# Patient Record
Sex: MALE | Race: ASIAN | NOT HISPANIC OR LATINO | Employment: FULL TIME | URBAN - METROPOLITAN AREA
[De-identification: names, ages, dates, MRNs, and addresses within clinical notes are randomized per-mention and may not be internally consistent; named-entity substitution may affect disease eponyms.]

---

## 2017-06-11 ENCOUNTER — OFFICE VISIT (OUTPATIENT)
Dept: URGENT CARE | Facility: URGENT CARE | Age: 57
End: 2017-06-11
Payer: COMMERCIAL

## 2017-06-11 VITALS
OXYGEN SATURATION: 97 % | HEART RATE: 104 BPM | WEIGHT: 216 LBS | DIASTOLIC BLOOD PRESSURE: 104 MMHG | BODY MASS INDEX: 34.86 KG/M2 | SYSTOLIC BLOOD PRESSURE: 150 MMHG | TEMPERATURE: 97.9 F

## 2017-06-11 DIAGNOSIS — E79.0 ELEVATED URIC ACID IN BLOOD: ICD-10-CM

## 2017-06-11 DIAGNOSIS — M10.071 ACUTE IDIOPATHIC GOUT OF RIGHT FOOT: Primary | ICD-10-CM

## 2017-06-11 PROCEDURE — 99213 OFFICE O/P EST LOW 20 MIN: CPT | Performed by: FAMILY MEDICINE

## 2017-06-11 RX ORDER — COLCHICINE 0.6 MG/1
1 CAPSULE ORAL CONTINUOUS PRN
Qty: 15 CAPSULE | Refills: 1 | Status: SHIPPED | OUTPATIENT
Start: 2017-06-11 | End: 2017-06-11

## 2017-06-11 RX ORDER — ALLOPURINOL 300 MG/1
300 TABLET ORAL DAILY
Qty: 30 TABLET | Refills: 1 | Status: SHIPPED | OUTPATIENT
Start: 2017-06-11 | End: 2020-07-25

## 2017-06-11 RX ORDER — COLCHICINE 0.6 MG/1
1 CAPSULE ORAL CONTINUOUS PRN
Qty: 15 CAPSULE | Refills: 1 | Status: SHIPPED | OUTPATIENT
Start: 2017-06-11 | End: 2018-04-13

## 2017-06-11 NOTE — MR AVS SNAPSHOT
"              After Visit Summary   6/11/2017    Cydney Flores    MRN: [id number]           Patient Information     Date Of Birth          1960        Visit Information        Provider Department      6/11/2017 1:10 PM Anand Morelos MD [facility]        Today's Diagnoses     Acute gout of left ankle, unspecified cause        Acute gouty arthritis          Care Instructions    cochicine as needed for gout flareups    Start the once daily allopurinol, which is a chronic med to reduce uric acid levels and thus reduce number and severity or flarups ( preventive med )    Advise follow up in clinic in next couple months, sooner if needed             Follow-ups after your visit        Who to contact     If you have questions or need follow up information about today's clinic visit or your schedule please contact [facility] directly at [phone].  Normal or non-critical lab and imaging results will be communicated to you by [employer], letter or phone within 4 business days after the clinic has received the results. If you do not hear from us within 7 days, please contact the clinic through [employer] or phone. If you have a critical or abnormal lab result, we will notify you by phone as soon as possible.  Submit refill requests through [employer] or call your pharmacy and they will forward the refill request to us. Please allow 3 business days for your refill to be completed.          Additional Information About Your Visit        MyChart Information     [employer] lets you send messages to your doctor, view your test results, renew your prescriptions, schedule appointments and more. To sign up, go to [email or web address] . Click on \"Log in\" on the left side of the screen, which will take you to the Welcome page. Then click on \"Sign up Now\" on the right side of the page.     You will be asked to enter the access code listed below, as well as some personal information. Please follow the " directions to create your username and password.     Your access code is: [email or web address]  Expires: 2017  1:29 PM     Your access code will  in 90 days. If you need help or a new code, please call your [facility] clinic or [phone].        Care EveryWhere ID     This is your Care EveryWhere ID. This could be used by other organizations to access your [facility] medical records  [id number]        Your Vitals Were     Pulse Temperature Pulse Oximetry BMI (Body Mass Index)          104 97.9  F (36.6  C) (Oral) 97% 34.86 kg/m2         Blood Pressure from Last 3 Encounters:   17 (!) 150/104   16 (!) 155/99   11/19/15 (!) 145/93    Weight from Last 3 Encounters:   17 216 lb (98 kg)   16 222 lb (100.7 kg)   14 197 lb 12.8 oz (89.7 kg)              Today, you had the following     No orders found for display         Where to get your medicines      These [place] [facility] [place] [address]  [address], [facility] [address]     Phone:  [phone]     allopurinol 300 MG tablet         Some of these will need a paper prescription and others can be bought over the counter.  Ask your nurse if you have questions.     Bring a paper prescription for each of these medications     Colchicine 0.6 MG Caps          Primary Care Provider Office Phone # Fax #    Lencho Montemayor [phone]       [facility] [place] [address]  [place] [address]        Thank you!     Thank you for choosing [facility]  for your care. Our goal is always to provide you with excellent care. Hearing back from our patients is one way we can continue to improve our services. Please take a few minutes to complete the written survey that you may receive in the mail after your visit with us. Thank you!             Your Updated Medication List - Protect others around you: Learn how to safely use, store and  throw away your medicines at www.disposemymeds.org.          This list is accurate as of: 6/11/17  1:29 PM.  Always use your most recent med list.                   Brand Name Dispense Instructions for use    allopurinol 300 MG tablet    ZYLOPRIM    30 tablet    Take 1 tablet (300 mg) by mouth daily       atovaquone-proguanil 250-100 MG per tablet    MALARONE    33 tablet    Take 1 tablet by mouth daily. Start 2 days before travel and continue 7 days after return.       Colchicine 0.6 MG Caps     15 capsule    Take 0.6 mg by mouth continuous prn Take 2 capsules by mouth once, then 1 capsule 1 hour later as needed.  May take 1 capsule every 12 hours as needed after that.       indomethacin 50 MG capsule    INDOCIN    42 capsule    Take 1 capsule (50 mg) by mouth 3 times daily (with meals)       NO ACTIVE MEDICATIONS

## 2017-06-11 NOTE — PROGRESS NOTES
Cydney Flores is a 57 year old male who comes in today for gout.    Started a day ago.    Gets it a couple times per year.    No trauma to foot.    Busy working, lots of rice/ meat/ veggies    Drinks water/ occasional soda    Juices       On exam, patient has classic redness, swelling and tenderness of the right foot 1st mtp joint    Remainder of foot/ ankle okay    No edema in lower legs    Mentation and affect fine    ASSESSMENT / PLAN:  (M10.071) Acute idiopathic gout of right foot  (primary encounter diagnosis)  Comment: patient has used indomethacin and colchicine in past for gout flareups.  He prefers colchicine.  Plan: Colchicine 0.6 MG CAPS        Refill given.    (E79.0) Elevated uric acid in blood  Comment: of note at one point 3 years ago patient was given allopurinol but he didn't stay on this.  Given the frequency of his gout flares, prudent to use chronic preventive med to reduce uric acid levels.  Discussed in detail.   Plan: allopurinol (ZYLOPRIM) 300 MG tablet        Start this.  Warned of initial flare.      Advised patient follow up within 2 months in clinic.  Be seen sooner if needed.          I reviewed the patient's medications, allergies, medical history, family history, and social history.    Anand Morelos MD

## 2017-06-11 NOTE — NURSING NOTE
"Chief Complaint   Patient presents with     Refill Request     Pt does not have primary provider and he wants to refill his Inomethacin 50 mg and Colcrys 0.6 mg       Initial BP (!) 150/104  Pulse 104  Temp 97.9  F (36.6  C) (Oral)  Wt 216 lb (98 kg)  SpO2 97%  BMI 34.86 kg/m2 Estimated body mass index is 34.86 kg/(m^2) as calculated from the following:    Height as of 9/26/16: 5' 6\" (1.676 m).    Weight as of this encounter: 216 lb (98 kg).  Medication Reconciliation: complete   April Marsh MA        "

## 2017-06-11 NOTE — PATIENT INSTRUCTIONS
cochicine as needed for gout flareups    Start the once daily allopurinol, which is a chronic med to reduce uric acid levels and thus reduce number and severity or flarups ( preventive med )    Advise follow up in clinic in next couple months, sooner if needed

## 2017-12-21 PROBLEM — M10.9 GOUT, UNSPECIFIED CAUSE, UNSPECIFIED CHRONICITY, UNSPECIFIED SITE: Status: ACTIVE | Noted: 2017-12-21

## 2017-12-30 DIAGNOSIS — M10.9 GOUT, UNSPECIFIED CAUSE, UNSPECIFIED CHRONICITY, UNSPECIFIED SITE: ICD-10-CM

## 2018-01-02 RX ORDER — COLCHICINE 0.6 MG/1
TABLET ORAL
Qty: 30 TABLET | Refills: 0 | Status: SHIPPED | OUTPATIENT
Start: 2018-01-02 | End: 2018-05-08

## 2018-01-02 NOTE — TELEPHONE ENCOUNTER
Requested Prescriptions   Pending Prescriptions Disp Refills     colchicine 0.6 MG tablet [Pharmacy Med Name: COLCHICINE 0.6MG TABS] 30 tablet 0        Last Written Prescription Date:  12/21/17  Last Fill Quantity: 30,  # refills: 0   Last Office Visit with G, P or [facility] prescribing provider:  6/11/17   Future Office Visit:      Sig: TAKE 2 TABLETS BY MOUTH IMMEDIATELY, THEN 1 TABLET 1 HOUR LATER AS NEEDED. MAY TAKE 1 TABLET EVERY 12 HOURS AS NEEDED AFTER THAT    Gout Agents Protocol Failed    12/30/2017  2:22 PM       Failed - CBC on file in past 12 months    No lab results found.         Failed - ALT on file in past 12 months    Recent Labs   Lab Test  02/25/12   1522   ALT  35            Failed - Normal serum creatinine on file in the past 12 months    Recent Labs   Lab Test  02/25/12   1522   CR  1.16            Passed - Uric acid on file in past 12 months    Recent Labs   Lab Test  06/06/10   1045   URIC  8.9*     If level is 6mg/dL or greater, ok to refill one time and refer to provider.          Passed - Recent or future visit with authorizing provider's specialty    Patient had office visit in the last year or has a visit in the next 30 days with authorizing provider.  See chart review.              Passed - Patient is age 18 or older        Routing refill request to provider for review/approval because:  Labs not current:  CBC, AST, ALT    Emma Vaz RN  [facility]

## 2018-04-13 ENCOUNTER — OFFICE VISIT (OUTPATIENT)
Dept: FAMILY MEDICINE | Facility: CLINIC | Age: 58
End: 2018-04-13
Payer: COMMERCIAL

## 2018-04-13 VITALS
TEMPERATURE: 98.4 F | BODY MASS INDEX: 36 KG/M2 | SYSTOLIC BLOOD PRESSURE: 153 MMHG | HEIGHT: 66 IN | DIASTOLIC BLOOD PRESSURE: 100 MMHG | HEART RATE: 94 BPM | WEIGHT: 224 LBS | OXYGEN SATURATION: 95 %

## 2018-04-13 DIAGNOSIS — Z11.59 NEED FOR HEPATITIS C SCREENING TEST: ICD-10-CM

## 2018-04-13 DIAGNOSIS — I10 ESSENTIAL HYPERTENSION WITH GOAL BLOOD PRESSURE LESS THAN 140/90: ICD-10-CM

## 2018-04-13 DIAGNOSIS — Z00.00 ENCOUNTER FOR ROUTINE ADULT HEALTH EXAMINATION WITHOUT ABNORMAL FINDINGS: Primary | ICD-10-CM

## 2018-04-13 DIAGNOSIS — Z13.6 CARDIOVASCULAR SCREENING; LDL GOAL LESS THAN 160: ICD-10-CM

## 2018-04-13 DIAGNOSIS — M1A.09X0 IDIOPATHIC CHRONIC GOUT OF MULTIPLE SITES WITHOUT TOPHUS: ICD-10-CM

## 2018-04-13 DIAGNOSIS — Z12.5 SCREENING FOR PROSTATE CANCER: ICD-10-CM

## 2018-04-13 DIAGNOSIS — Z12.11 SCREEN FOR COLON CANCER: ICD-10-CM

## 2018-04-13 DIAGNOSIS — R10.33 PERIUMBILICAL PAIN: ICD-10-CM

## 2018-04-13 PROBLEM — E66.01 SEVERE OBESITY (BMI 35.0-39.9) WITH COMORBIDITY (H): Status: ACTIVE | Noted: 2018-04-13

## 2018-04-13 LAB
ALT SERPL W P-5'-P-CCNC: 35 U/L (ref 0–70)
ANION GAP SERPL CALCULATED.3IONS-SCNC: 8 MMOL/L (ref 3–14)
BUN SERPL-MCNC: 24 MG/DL (ref 7–30)
CALCIUM SERPL-MCNC: 8.7 MG/DL (ref 8.5–10.1)
CHLORIDE SERPL-SCNC: 109 MMOL/L (ref 94–109)
CHOLEST SERPL-MCNC: 198 MG/DL
CO2 SERPL-SCNC: 22 MMOL/L (ref 20–32)
CREAT SERPL-MCNC: 1.4 MG/DL (ref 0.66–1.25)
ERYTHROCYTE [DISTWIDTH] IN BLOOD BY AUTOMATED COUNT: 13 % (ref 10–15)
GFR SERPL CREATININE-BSD FRML MDRD: 52 ML/MIN/1.7M2
GLUCOSE SERPL-MCNC: 93 MG/DL (ref 70–99)
HCT VFR BLD AUTO: 43.5 % (ref 40–53)
HDLC SERPL-MCNC: 34 MG/DL
HGB BLD-MCNC: 14.7 G/DL (ref 13.3–17.7)
LDLC SERPL CALC-MCNC: ABNORMAL MG/DL
LDLC SERPL DIRECT ASSAY-MCNC: 99 MG/DL
MCH RBC QN AUTO: 31.1 PG (ref 26.5–33)
MCHC RBC AUTO-ENTMCNC: 33.8 G/DL (ref 31.5–36.5)
MCV RBC AUTO: 92 FL (ref 78–100)
NONHDLC SERPL-MCNC: 164 MG/DL
PLATELET # BLD AUTO: 502 10E9/L (ref 150–450)
POTASSIUM SERPL-SCNC: 3.2 MMOL/L (ref 3.4–5.3)
PSA SERPL-ACNC: 0.46 UG/L (ref 0–4)
RBC # BLD AUTO: 4.73 10E12/L (ref 4.4–5.9)
SODIUM SERPL-SCNC: 139 MMOL/L (ref 133–144)
TRIGL SERPL-MCNC: 469 MG/DL
URATE SERPL-MCNC: 8.1 MG/DL (ref 3.5–7.2)
WBC # BLD AUTO: 8 10E9/L (ref 4–11)

## 2018-04-13 PROCEDURE — 84460 ALANINE AMINO (ALT) (SGPT): CPT | Performed by: FAMILY MEDICINE

## 2018-04-13 PROCEDURE — 86803 HEPATITIS C AB TEST: CPT | Performed by: FAMILY MEDICINE

## 2018-04-13 PROCEDURE — 36415 COLL VENOUS BLD VENIPUNCTURE: CPT | Performed by: FAMILY MEDICINE

## 2018-04-13 PROCEDURE — 84550 ASSAY OF BLOOD/URIC ACID: CPT | Performed by: FAMILY MEDICINE

## 2018-04-13 PROCEDURE — 85027 COMPLETE CBC AUTOMATED: CPT | Performed by: FAMILY MEDICINE

## 2018-04-13 PROCEDURE — G0103 PSA SCREENING: HCPCS | Performed by: FAMILY MEDICINE

## 2018-04-13 PROCEDURE — 99396 PREV VISIT EST AGE 40-64: CPT | Performed by: FAMILY MEDICINE

## 2018-04-13 PROCEDURE — 83721 ASSAY OF BLOOD LIPOPROTEIN: CPT | Mod: 59 | Performed by: FAMILY MEDICINE

## 2018-04-13 PROCEDURE — 80061 LIPID PANEL: CPT | Performed by: FAMILY MEDICINE

## 2018-04-13 PROCEDURE — 80048 BASIC METABOLIC PNL TOTAL CA: CPT | Performed by: FAMILY MEDICINE

## 2018-04-13 PROCEDURE — 99213 OFFICE O/P EST LOW 20 MIN: CPT | Mod: 25 | Performed by: FAMILY MEDICINE

## 2018-04-13 ASSESSMENT — PAIN SCALES - GENERAL: PAINLEVEL: NO PAIN (0)

## 2018-04-13 NOTE — PATIENT INSTRUCTIONS
At [facility], we strive to deliver an exceptional experience to you, every time we see you.  If you receive a survey in the mail, please send us back your thoughts. We really do value your feedback.    Based on your medical history, these are the current health maintenance/preventive care services that you are due for (some may have been done at this visit.)  Health Maintenance Due   Topic Date Due     HEPATITIS C SCREENING  06/01/1979     COLON CANCER SCREEN (SYSTEM ASSIGNED)  06/01/2011     ADVANCE DIRECTIVE PLANNING Q5 YRS  06/01/2016     LIPID SCREEN Q5 YR MALE (SYSTEM ASSIGNED)  02/25/2017         Suggested websites for health information:  Www.[email or web address] : Up to date and easily searchable information on multiple topics.  Www.medlineplus.gov : medication info, interactive tutorials, watch real surgeries online  Www.familydoctor.org : good info from the Academy of Family Physicians  Www.cdc.gov : public health info, travel advisories, epidemics (H1N1)  Www.aap.org : children's health info, normal development, vaccinations  Www.[email or web address] : [place] dept of health, public health issues in [place], N1N1    Your care team:                            Family Medicine Internal Medicine   MD Xander Goldstein MD Shantel Branch-Fleming, MD Katya Georgiev PA-C Megan Hill, APRSKYLAR Ruiz MD Pediatrics   FAYE Villavicencio, KAYLA Navas APRN CNP   MD Judith Samano MD Deborah Mielke, MD Kim Thein, APRN [facility]      Clinic hours: Monday - Thursday 7 am-7 pm; Fridays 7 am-5 pm.   Urgent care: Monday - Friday 11 am-9 pm; Saturday and Sunday 9 am-5 pm.  Pharmacy : Monday -Thursday 8 am-8 pm; Friday 8 am-6 pm; Saturday and Sunday 9 am-5 pm.     Clinic: [phone]   Pharmacy: [phone]        Preventive Health Recommendations  Male Ages 50   64    Yearly exam:             See your health care provider every year in order to  o   Review  health changes.   o   Discuss preventive care.    o   Review your medicines if your doctor has prescribed any.     Have a cholesterol test every 5 years, or more frequently if you are at risk for high cholesterol/heart disease.     Have a diabetes test (fasting glucose) every three years. If you are at risk for diabetes, you should have this test more often.     Have a colonoscopy at age 50, or have a yearly FIT test (stool test). These exams will check for colon cancer.      Talk with your health care provider about whether or not a prostate cancer screening test (PSA) is right for you.    You should be tested each year for STDs (sexually transmitted diseases), if you re at risk.     Shots: Get a flu shot each year. Get a tetanus shot every 10 years.     Nutrition:    Eat at least 5 servings of fruits and vegetables daily.     Eat whole-grain bread, whole-wheat pasta and brown rice instead of white grains and rice.     Talk to your provider about Calcium and Vitamin D.     Lifestyle    Aerobic exercise for at least 150 minutes a week (40+ minutes per day, 4-6 days per week). This will help you control your weight and prevent disease.      Limit alcohol to one drink per day.     No smoking.     Wear sunscreen to prevent skin cancer.     See your dentist every six months for an exam and cleaning.     See your eye doctor every 1 to 2 years.    Uncontrolled High Blood Pressure (Established)    Your blood pressure was unusually high today. This can occur if you ve missed doses of your blood pressure medicine. Or it can happen if you are taking other medicines. These include some asthma inhalers, decongestants, diet pills, and street drugs like cocaine and amphetamine.  Other causes include:    Weight gain    More salt in your diet    Smoking    Caffeine  Your blood pressure can also rise if you are emotionally upset or in intense pain. It may go back to normal after a period of rest.  A blood pressure reading is made up  of 2 numbers. There is a top number over a bottom number. The top number is the systolic pressure. The bottom number is the diastolic pressure. A normal blood pressure is a systolic pressure of less than 120 over a diastolic pressure of less than 80. High blood pressure (hypertension) is when the top number is 140 or higher. Or it is when the bottom number is 90 or higher. You will see your blood pressure readings written together. For example, a person with a systolic pressure of 118 and a diastolic pressure of 78 will have 118/78 written in the medical record. To be high blood pressure, the numbers must be higher when tested over a period of time. The blood pressures between normal and hypertension are called prehypertension. Prehypertension is a warning sign. The information gives you a chance to make lifestyle changes (weight loss, more exercise) that can keep your blood pressure from going higher.  Home care  It s important to take steps to lower your blood pressure. If you are taking blood pressure medicine, the guidelines below may help you need less or no medicines in the future.    Begin a weight-loss program if you are overweight.    Cut back on the amount of salt in your diet:    Avoid high-salt foods like olives, pickles, smoked meats, and salted potato chips.    Don t add salt to your food at the table.    Use only small amounts of salt when cooking.    Begin an exercise program. Talk with your health care provider about what exercise program is best for you. It doesn t have to be difficult. Even brisk walking for 20 minutes 3 times a week is a good form of exercise.    Avoid medicines that stimulates the heart. This includes many over-the-counter cold and sinus decongestant pills and sprays, as well as diet pills. Check the warnings about hypertension on the label. Before purchasing any over-the-counter medicines or supplements, always ask the pharmacist about the product's potential interaction with  your high blood pressure and your medicines.    Stimulants such as amphetamine or cocaine could be lethal for someone with hypertension. Never take these.    Limit how much caffeine you drink. Or switch to noncaffeinated beverages.    Stop smoking. If you are a long-time smoker, this can be hard. Enroll in a stop-smoking program to make it more likely that you will succeed. Talk with your provider about ways to quit.    Learn how to handle stress better. This is an important part of any program to lower blood pressure. Learn ways to relax. These include meditation, yoga, and biofeedback.    If medicines were prescribed, take them exactly as directed. Missing doses may cause your blood pressure to get out of control.    If you miss a dose or doses of your medicines, check with your healthcare provider or pharmacist about what to do.    Consider buying an automatic blood pressure machine. Your provider may recommend a certain type. You can get one of these at most pharmacies. Measure your blood pressure twice a day, in the morning, and in the late afternoon. Keep a written record of your home blood pressure readings and take the record to your medical appointments.  Here are some additional guidelines on home blood pressure monitoring from the American Heart Association.    Don't smoke or drink coffee for 30 minutes    Go to the bathroom before the test.    Relax for 5 minutes before taking the measurement.    Sit correctly. Be sure your back is supported. Don't sit on a couch or soft chair. Uncross your feet and place them flat on the floor. Place your arm on a solid, flat surface like a table with the upper arm at heart level. Make certain the middle of the cuff is directly above the eye of the elbow. Check the monitor's instruction manual for an illustration.    Take multiple readings. When you measure, take 2 or 3 readings one minute apart and record all of the results.    Take your blood pressure at the same time  every day, or as your healthcare provider recommends.    Record the date, time, and blood pressure reading.    Take the record with you to your next appointment. If your blood pressure monitor has a built-in memory, simply take the monitor with you to your next appointment.    Call your provider if you have several high readings. Don't be frightened by a single high reading, but if you get several high readings, check in with your healthcare provider.    Note: When blood pressure reaches a systolic (top number) of 180 or higher or a diastolic (bottom number) of 110 or higher, emergency medical treatment is required. Call your healthcare provider immediately.  Follow-up care  Regular visits to your own healthcare provider for blood pressure and medicine checks are an important part of your care. Make a follow-up appointment as directed. Bring the record of your home blood pressure readings to the appointment.  When to seek medical advice  Call your healthcare provider right away if any of these occur:    Blood pressure reaches a systolic (top number) of 180 or higher or diastolic (bottom number) of 110 or higher, emergency medical treatment is required.    Chest, arm, shoulder, neck, or upper back pain    Shortness of breath    Severe headache    Throbbing or rushing sound in the ears    Nosebleed    Extreme drowsiness, confusion, or fainting    Dizziness or dizziness with spinning sensation (vertigo)    Weakness in an arm or leg or on one side of the face    Trouble speaking or seeing   Date Last Reviewed: 1/1/2017 2000-2017 [employer]. [address] [place] [address], [place] [id number]. All rights reserved. This information is not intended as a substitute for professional medical care. Always follow your healthcare professional's instructions.         Colorectal Cancer Screening    Colorectal cancer (cancer in the colon or rectum) is a leading cause of cancer deaths in the [place] But it doesn t have to be.  When this cancer is found and removed early, the chances of a full recovery are very good. Because colorectal cancer rarely causes symptoms in its early stages, screening for the disease is important. It s even more crucial if you have risk factors for the disease. Learn more about colorectal cancer and its risk factors. Then talk to your healthcare provider about being screened. You could be saving your own life.  Risk factors for colorectal cancer  Your risk of having colorectal cancer increases if you:    Are 50 years of age or older    Have a family history or personal history of colorectal cancer or polyps    Have a personal history of type 2 diabetes, Crohn s disease, or ulcerative colitis    Have an inherited genetic syndrome like Koch syndrome (also known as HNPCC) or familial adenomatous polyposis (FAP)    Are very overweight    Are not physically active    Smoke    Drink a lot of alcohol    Eat a lot of red or processed meat  The colon and rectum  Waste from food you eat enters the colon from the small intestine. As it travels through the colon, the waste (stool) loses water and becomes more solid. Intestinal muscles push it toward the sigmoid the last section of the colon. Stool then moves into the rectum, where it s stored until it s ready to leave the body during a bowel movement.  How cancer develops  Polyps are growths that form on the inner lining of the colon or rectum. Most are benign, which means they aren t cancerous. But over time, some polyps can become cancer (malignant). This happens when cells in these polyps begin growing abnormally. In time, malignant cells invade more and more of the colon and rectum. The cancer may also spread to nearby organs or lymph nodes or to other parts of the body. Finding and removing polyps can help prevent cancer from ever forming.  Your screening  Screening means looking for a health problem before you have symptoms. During screening for colorectal cancer,  your healthcare provider will ask about your health history, examine you, and do one or more tests.  History and exam  The history and exam involve the following:    Health history. Your healthcare provider will ask about your health history. Mention if a family member has had colon cancer or polyps. Also mention any health problems you have had in the past.    Digital rectal exam (MIGUEL). During a MIGUEL, the healthcare provider inserts a lubricated gloved finger into the rectum. The test is painless and takes less than a minute. Healthcare providers agree that this test alone is not enough to screen for colorectal cancer.  Screening test choices  Fecal occult blood test (FOBT) or fecal immunochemical test (FIT)  These tests check for occult blood in stool (blood you can t see). Hidden blood may be a sign of colon polyps or cancer. A small sample of stool is tested for blood in a laboratory. Most often, you collect this sample at home using a kit your healthcare provider gives you. Follow the instructions carefully for using this kit. You might need to avoid certain foods and medicines before the test, as directed.  Barium enema with contrast (double-contrast barium enema)  This test uses X-rays to provide images of the entire colon and rectum. The day before this test, you will need to do a bowel prep to clean out the colon and rectum. A bowel prep is a liquid diet plus strong laxatives or enemas. You will be awake for the test, but you may be given medicine to help you relax. At the start of the test, a radiologist (a healthcare provider who specializes in imaging tests) places a soft tube into the rectum. The tube is used to fill the colon with a contrast liquid (barium) and air. This can be uncomfortable for some people. The liquid helps the colon show up clearly on the X-rays. Because the test uses X-rays, it exposes you to a small amount of radiation.  Virtual colonoscopy  This exam is also called a CT  colonography. It uses a series of X-ray photographs to create a 3-D view of the colon and rectum. The day before the test, you will need to do a bowel prep to clean out your colon. Your healthcare provider will give you instructions on how to do this. During the procedure, you will lie on a table that is part of a special X-ray machine called a CT scanner. A small tube will be placed into your rectum to fill the colon and rectum with air. This can be uncomfortable for some people. Then, the table will move into the machine and pictures will be taken of your colon and rectum. A computer will combine these photos to create a 3-D picture. Because the test uses X-rays, it exposes you to a small amount of radiation.  Scope exams  Here are two types of scope exams:    Colonoscopy. This test can be used to find and remove polyps anywhere in the colon or rectum. The day before the test, you will do a bowel prep. This is a liquid diet plus a strong laxative solution or an enema. The bowel prep will cleanse your colon. You will be given instructions for this. Just before the test, you are given a medicine to make you sleepy. Then, a long, flexible, lighted tube called a colonoscope is gently inserted into the rectum and guided through the entire colon. Images of the colon are viewed on a video screen. Any polyps that are found are removed and sent to a lab for testing. If a polyp can t be removed, a sample of tissue is taken and the polyp might be removed later during surgery. You will need to bring someone with you to drive you home after this test.     Sigmoidoscopy. This test is similar to colonoscopy, but focuses only on the sigmoid colon and rectum. As with colonoscopy, bowel prep must be done the day before this test. It might not need to be as complete as the bowel prep for a colonoscopy. You are awake during the procedure, but you may be given medicine to help you relax. During the test, the healthcare provider guides  a thin, flexible, lighted tube called a sigmoidoscope through your rectum and lower colon. The images are displayed on a video screen. Polyps are removed, if possible, and sent to a lab for testing.  Colonoscopy is the only screening test that lets your healthcare provider see the entire colon and rectum. This test also lets your healthcare provider remove any pieces of tissue that need to be looked at by a lab. If something suspicious is found using any other tests, you will likely need a colonoscopy.     When to call your healthcare provider after a test  Call your healthcare provider if you have any of the following after any screening test:    Bleeding    Fever of 100.4 F (38 C) or higher, or as directed by your healthcare provider    Abdominal pain    Vomiting   Date Last Reviewed: 11/4/2015 2000-2017 [employer]. [address] [place] [address], [place] [address]. All rights reserved. This information is not intended as a substitute for professional medical care. Always follow your healthcare professional's instructions.      [employer] Hypertension Study Summary     Thank you for your interest in the [employer] hypertension research study. This study is designed to test whether genetically guided blood pressure medication management is better than the standard of care.  Both groups will use medications that have been used for many years to treat high blood pressure ( diuretics, beta blockers, calcium channel blockers, AceI /ARB).  The ORDER of medications chosen may be different however. All participants will receive the genetic testing for free along with free research related visits.  Participants will not be given the results of their genetic test results until the END of the study.Participants will also receive compensation totaling about $100 for completing all study visits and surveys.      If you would like to enroll or know more about using your genetics to determine which hypertensive medications may  work best for you please contact the research coordinator as [phone] or email [email or web address]    Who may qualify for the study:  1) New diagnosis of high blood pressure but not yet on blood pressure medication.  2) Existing diagnosis of hypertension but blood pressure is  uncontrolled with 1 or less class of medications.   3) Age between 30 and 70  4) BMI between 19-50    Who should NOT be in the study:    1) All patient taking more than 1 class of hypertensive medication are excluded.   2) Documented instance of following conditions    A. Cardiac Disease  i. ICD-10 Code for Coronary Artery Disease   CAD: 410.* -414.*, I25.*  ii. ICD-10 Code for Heart Failure   428.*, I50.*  iii. ICD-10 Code for Congenital Cardiac Disease   745.*, -747.*, Q20.*, -Q28.*   B. Kidney Disease        i. ICD-10 Code for Chronic Kidney Disease   CKD:ICD9 585.*and ICD10 N18.*   C. Vascular Disease        i. ICD-10 Code for Peripheral Vascular Disease   443.9, I73.9        ii. ICD-10 Code for Pulmonary Hypertension   416.0, 416.8, I27.0, I27.2   D. Secondary Hypertension                     i. ICD-10 Code for Hypertension Secondary to Other Diseases   I15.0-2, I15.8-9  3) Any patient who has chronic medical conditions that  their doctor/provider feels would make them unsafe to participate in a research study where initial choice of blood pressure  medication could be from 1 of these 4 BP classes of medicines: diuretics, beta blockers, calcium channel blockers, AceI /ARB.    Study visit occur at the following clinic locations  North:  1) [place]  2) [place]  3) [place]  4) [place]  5) [place]  6) [place]  7) [place]    [place]:  1) [facility])  2) [place]  [facility]) [facility]  [facility]) [place]    Patient Expectations:    1) Take Hypertension medications  2) Attend scheduled study visits  3) Complete online surveys sent between visits     If enrolled patient is asked to attend 5 to 8 study visits over the  next 12 months.

## 2018-04-13 NOTE — PROGRESS NOTES
SUBJECTIVE:   CC: Cydney Flores is an 57 year old male who presents for preventative health visit.     Healthy Habits:    Do you get at least three servings of calcium containing foods daily (dairy, green leafy vegetables, etc.)? yes    Amount of exercise or daily activities, outside of work: none    Problems taking medications regularly No    Medication side effects: No    Have you had an eye exam in the past two years? no    Do you see a dentist twice per year? no    Do you have sleep apnea, excessive snoring or daytime drowsiness?yes      Today's PHQ-2 Score:   PHQ-2 ( 1999 [employer]) 8/20/2012 1/9/2012   Q1: Little interest or pleasure in doing things 0 0   Q2: Feeling down, depressed or hopeless 0 0   PHQ-2 Score 0 0       Abuse: Current or Past(Physical, Sexual or Emotional)- NO  Do you feel safe in your environment - YES    Social History   Substance Use Topics     Smoking status: Never Smoker     Smokeless tobacco: Never Used     Alcohol use Yes      Comment: occasional       If you drink alcohol do you typically have >3 drinks per day or >7 drinks per week? No                      Last PSA:   PSA   Date Value Ref Range Status   02/25/2012 0.65 0 - 4 ug/L Final       Blood pressure:  He does not currently take medications for high blood pressure, and he believes he can lower this on his own with exercise in the summer.    Past medical, family, and social histories, medications, and allergies are reviewed and updated in Epic.     ROS:  C: NEGATIVE for fever, chills, change in weight  I: NEGATIVE for worrisome rashes, moles or lesions  E: NEGATIVE for vision changes or irritation  ENT: NEGATIVE for ear, mouth and throat problems  R: NEGATIVE for significant cough or SOB  CV: NEGATIVE for chest pain, palpitations or peripheral edema  GI: For a couple of years, he has had issues with digesting his food and has pain occasionally in the epigastric area but mainly in the periumbilical area. He was evaluated by a provider  "for that and was recommended to take Prilosec.   male: negative for dysuria, hematuria, decreased urinary stream, erectile dysfunction, urethral discharge  M: NEGATIVE for significant arthralgias or myalgia  N: NEGATIVE for weakness, dizziness or paresthesias  P: NEGATIVE for changes in mood or affect    This document serves as a record of the services and decisions personally performed and made by Dr. Winn. It was created on his behalf by Danya Barrett, a trained medical scribe. The creation of this document is based the provider's statements to the medical scribe.  Danya Barrett April 13, 2018 4:45 PM   OBJECTIVE:   BP (!) 153/100  Pulse 94  Temp 98.4  F (36.9  C) (Oral)  Ht 1.676 m (5' 6\")  Wt 101.6 kg (224 lb)  SpO2 95%  BMI 36.15 kg/m2  EXAM:  GENERAL: healthy, alert and no distress  EYES: Eyes grossly normal to inspection, PERRL and conjunctivae and sclerae normal  HENT: ear canals and TM's normal, nose and mouth without ulcers or lesions  NECK: no adenopathy, no asymmetry, masses, or scars and thyroid normal to palpation  RESP: lungs clear to auscultation, no crackles or wheezes, no areas of dullness, no tachypnea   CV: regular rate and rhythm, normal S1 S2, no S3 or S4, no murmur, click or rub, no peripheral edema and peripheral pulses strong  ABDOMEN: soft, nontender, no hepatosplenomegaly, no masses and bowel sounds normal   (male): normal male genitalia without lesions or urethral discharge, no hernia  MS: no gross musculoskeletal defects noted, no edema  SKIN: no suspicious lesions or rashes  NEURO: Normal strength and tone, mentation intact and speech normal, DTRs symmetrical, cranial nerves 2-12 intact   PSYCH: mentation appears normal, affect normal/bright     ASSESSMENT/PLAN:   (Z00.00) Encounter for routine adult health examination without abnormal findings  (primary encounter diagnosis)  Comment: Negative screening exam; up-to-date on preventive services except colon cancer " screening.   Plan: Hepatitis C Screen Reflex to HCV RNA Quant and         Genotype, Lipid panel reflex to direct LDL         Non-fasting, Prostate spec antigen screen        Follow up in 1 year.  Information sheet regarding split billing provided to patient upon arrival and attached to his AVS.    (I10) Essential hypertension with goal blood pressure less than 140/90  Comment: Uncontrolled while unmedicated. He is aware of the diagnosis but is not interested in starting a medication at this time and prefers to recheck his blood pressure later in the summer once he resumes his exercise routine and hopefully loses weight.  Plan: Basic metabolic panel, CBC with platelets        Handouts, blood pressure graph, and weight graph provided. Information on PGen study provided. He is aware that he may be contacted regarding PGen but would not necessarily enroll in the study unless he is still hypertensive this summer. Follow up in 3 months to recheck blood pressure. If still elevated at that visit, I recommend starting medication.    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  Plan: Uric acid, CBC with platelets, ALT        Adjust allopurinol dose as needed. Refills were not requested today.     (R10.33) Periumbilical pain  Comment: Patient reports partial response to the Prilosec, but an exact cause of his discomfort is not completely understood, so I think he should have direct visualization.   Plan: GASTROENTEROLOGY ADULT REF PROCEDURE ONLY [facility] [phone]; [facility]         Surgery            (Z12.11) Screen for colon cancer  Comment: Patient reports having had 2 colonoscopies in the past, starting under age 50. I don't have the details of this, but it suggests he have a history of adenomas, so an earlier colonoscopy would be more appropriate than FIT and this can help evaluate his abdominal complaints.   Plan: GASTROENTEROLOGY ADULT REF PROCEDURE  "ONLY [facility]        [facility] [phone]; [facility]         Surgery        Handouts provided. I encouraged him to schedule a colonoscopy at the same time as his EGD.    (Z12.5) Screening for prostate cancer  Comment:   Plan: Prostate spec antigen screen            (Z11.59) Need for hepatitis C screening test  Comment: indications for screening discussed with the patient   Plan: Hepatitis C Screen Reflex to HCV RNA Quant and         Genotype            (Z13.6) CARDIOVASCULAR SCREENING; LDL GOAL LESS THAN 160  Comment: LDL = 160 at last check in 2012.  Plan: Lipid panel reflex to direct LDL Non-fasting        Monitor periodically.       COUNSELING:  Reviewed preventive health counseling, as reflected in patient instructions  Special attention given to:        Regular exercise       Consider Hep C screening for patients born between 1945 and 1965       Colon cancer screening       Prostate cancer screening    BP Screening:   Last 3 BP Readings:    BP Readings from Last 3 Encounters:   04/13/18 (!) 153/100   06/11/17 (!) 150/104   09/26/16 (!) 155/99     The following was recommended to the patient:  Recommend lifestyle modifications and Laboratory tests and return in summer to recheck blood pressure     reports that he has never smoked. He has never used smokeless tobacco.    Estimated body mass index is 36.15 kg/(m^2) as calculated from the following:    Height as of this encounter: 1.676 m (5' 6\").    Weight as of this encounter: 101.6 kg (224 lb).   Weight management plan: encouraged increased aerobic exercise, as outlined in the AVS. In the summer, he uses workout machines 3 times per week. In the winter, however, he does not do any exercise.    Counseling Resources:  ATP IV Guidelines  Pooled Cohorts Equation Calculator  FRAX Risk Assessment  ICSI Preventive Guidelines  Dietary Guidelines for Americans, 2010  USDA's MyPlate  ASA Prophylaxis  Lung CA Screening    The information in this document, created " by the medical scribe for me, accurately reflects the services I personally performed and the decisions made by me. I have reviewed and approved this document for accuracy prior to leaving the patient care area. April 13, 2018 4:45 PM        Morgan Winn MD  [facility]

## 2018-04-13 NOTE — MR AVS SNAPSHOT
After Visit Summary   4/13/2018    Cydney Flores    MRN: [id number]           Patient Information     Date Of Birth          1960        Visit Information        Provider Department      4/13/2018 4:00 PM Morgan Winn MD [facility]        Today's Diagnoses     Encounter for routine adult health examination without abnormal findings    -  1    Essential hypertension with goal blood pressure less than 140/90        Idiopathic chronic gout of multiple sites without tophus        Periumbilical pain        Screen for colon cancer        Screening for prostate cancer        Need for hepatitis C screening test        CARDIOVASCULAR SCREENING; LDL GOAL LESS THAN 160          Care Instructions    At [facility], we strive to deliver an exceptional experience to you, every time we see you.  If you receive a survey in the mail, please send us back your thoughts. We really do value your feedback.    Based on your medical history, these are the current health maintenance/preventive care services that you are due for (some may have been done at this visit.)  Health Maintenance Due   Topic Date Due     HEPATITIS C SCREENING  06/01/1979     COLON CANCER SCREEN (SYSTEM ASSIGNED)  06/01/2011     ADVANCE DIRECTIVE PLANNING Q5 YRS  06/01/2016     LIPID SCREEN Q5 YR MALE (SYSTEM ASSIGNED)  02/25/2017         Suggested websites for health information:  Www.[employer]itroS[employer].org : Up to date and easily searchable information on multiple topics.  Www.medlineplus.gov : medication info, interactive tutorials, watch real surgeries online  Www.familydoctor.org : good info from the Academy of Family Physicians  Www.cdc.gov : public health info, travel advisories, epidemics (H1N1)  Www.aap.org : children's health info, normal development, vaccinations  Www.health.state.[place].us : [place] dept of health, public health issues in [place], N1N1    Your care team:                            Family Medicine  Internal Medicine   MD Xander Goldstein MD Shantel Branch-Fleming, MD Katya Georgiev PA-C Megan Hill, APRN KAYLA Ruiz MD Pediatrics   FAYE Villavicencio, KAYLA Navas APRN CNP   MD Judith Samano MD Deborah Mielke, MD Kim Thein, APRN [facility]      Clinic hours: Monday - Thursday 7 am-7 pm; Fridays 7 am-5 pm.   Urgent care: Monday - Friday 11 am-9 pm; Saturday and Sunday 9 am-5 pm.  Pharmacy : Monday -Thursday 8 am-8 pm; Friday 8 am-6 pm; Saturday and Sunday 9 am-5 pm.     Clinic: [phone]   Pharmacy: [phone]        Preventive Health Recommendations  Male Ages 50 - 64    Yearly exam:             See your health care provider every year in order to  o   Review health changes.   o   Discuss preventive care.    o   Review your medicines if your doctor has prescribed any.     Have a cholesterol test every 5 years, or more frequently if you are at risk for high cholesterol/heart disease.     Have a diabetes test (fasting glucose) every three years. If you are at risk for diabetes, you should have this test more often.     Have a colonoscopy at age 50, or have a yearly FIT test (stool test). These exams will check for colon cancer.      Talk with your health care provider about whether or not a prostate cancer screening test (PSA) is right for you.    You should be tested each year for STDs (sexually transmitted diseases), if you re at risk.     Shots: Get a flu shot each year. Get a tetanus shot every 10 years.     Nutrition:    Eat at least 5 servings of fruits and vegetables daily.     Eat whole-grain bread, whole-wheat pasta and brown rice instead of white grains and rice.     Talk to your provider about Calcium and Vitamin D.     Lifestyle    Aerobic exercise for at least 150 minutes a week (40+ minutes per day, 4-6 days per week). This will help you control your weight and prevent disease.      Limit alcohol to one drink per day.     No  smoking.     Wear sunscreen to prevent skin cancer.     See your dentist every six months for an exam and cleaning.     See your eye doctor every 1 to 2 years.    Uncontrolled High Blood Pressure (Established)    Your blood pressure was unusually high today. This can occur if you ve missed doses of your blood pressure medicine. Or it can happen if you are taking other medicines. These include some asthma inhalers, decongestants, diet pills, and street drugs like cocaine and amphetamine.  Other causes include:    Weight gain    More salt in your diet    Smoking    Caffeine  Your blood pressure can also rise if you are emotionally upset or in intense pain. It may go back to normal after a period of rest.  A blood pressure reading is made up of 2 numbers. There is a top number over a bottom number. The top number is the systolic pressure. The bottom number is the diastolic pressure. A normal blood pressure is a systolic pressure of less than 120 over a diastolic pressure of less than 80. High blood pressure (hypertension) is when the top number is 140 or higher. Or it is when the bottom number is 90 or higher. You will see your blood pressure readings written together. For example, a person with a systolic pressure of 118 and a diastolic pressure of 78 will have 118/78 written in the medical record. To be high blood pressure, the numbers must be higher when tested over a period of time. The blood pressures between normal and hypertension are called prehypertension. Prehypertension is a warning sign. The information gives you a chance to make lifestyle changes (weight loss, more exercise) that can keep your blood pressure from going higher.  Home care  It s important to take steps to lower your blood pressure. If you are taking blood pressure medicine, the guidelines below may help you need less or no medicines in the future.    Begin a weight-loss program if you are overweight.    Cut back on the amount of salt in your  diet:    Avoid high-salt foods like olives, pickles, smoked meats, and salted potato chips.    Don t add salt to your food at the table.    Use only small amounts of salt when cooking.    Begin an exercise program. Talk with your health care provider about what exercise program is best for you. It doesn t have to be difficult. Even brisk walking for 20 minutes 3 times a week is a good form of exercise.    Avoid medicines that stimulates the heart. This includes many over-the-counter cold and sinus decongestant pills and sprays, as well as diet pills. Check the warnings about hypertension on the label. Before purchasing any over-the-counter medicines or supplements, always ask the pharmacist about the product's potential interaction with your high blood pressure and your medicines.    Stimulants such as amphetamine or cocaine could be lethal for someone with hypertension. Never take these.    Limit how much caffeine you drink. Or switch to noncaffeinated beverages.    Stop smoking. If you are a long-time smoker, this can be hard. Enroll in a stop-smoking program to make it more likely that you will succeed. Talk with your provider about ways to quit.    Learn how to handle stress better. This is an important part of any program to lower blood pressure. Learn ways to relax. These include meditation, yoga, and biofeedback.    If medicines were prescribed, take them exactly as directed. Missing doses may cause your blood pressure to get out of control.    If you miss a dose or doses of your medicines, check with your healthcare provider or pharmacist about what to do.    Consider buying an automatic blood pressure machine. Your provider may recommend a certain type. You can get one of these at most pharmacies. Measure your blood pressure twice a day, in the morning, and in the late afternoon. Keep a written record of your home blood pressure readings and take the record to your medical appointments.  Here are some  additional guidelines on home blood pressure monitoring from the American Heart Association.    Don't smoke or drink coffee for 30 minutes    Go to the bathroom before the test.    Relax for 5 minutes before taking the measurement.    Sit correctly. Be sure your back is supported. Don't sit on a couch or soft chair. Uncross your feet and place them flat on the floor. Place your arm on a solid, flat surface like a table with the upper arm at heart level. Make certain the middle of the cuff is directly above the eye of the elbow. Check the monitor's instruction manual for an illustration.    Take multiple readings. When you measure, take 2 or 3 readings one minute apart and record all of the results.    Take your blood pressure at the same time every day, or as your healthcare provider recommends.    Record the date, time, and blood pressure reading.    Take the record with you to your next appointment. If your blood pressure monitor has a built-in memory, simply take the monitor with you to your next appointment.    Call your provider if you have several high readings. Don't be frightened by a single high reading, but if you get several high readings, check in with your healthcare provider.    Note: When blood pressure reaches a systolic (top number) of 180 or higher or a diastolic (bottom number) of 110 or higher, emergency medical treatment is required. Call your healthcare provider immediately.  Follow-up care  Regular visits to your own healthcare provider for blood pressure and medicine checks are an important part of your care. Make a follow-up appointment as directed. Bring the record of your home blood pressure readings to the appointment.  When to seek medical advice  Call your healthcare provider right away if any of these occur:    Blood pressure reaches a systolic (top number) of 180 or higher or diastolic (bottom number) of 110 or higher, emergency medical treatment is required.    Chest, arm, shoulder,  neck, or upper back pain    Shortness of breath    Severe headache    Throbbing or rushing sound in the ears    Nosebleed    Extreme drowsiness, confusion, or fainting    Dizziness or dizziness with spinning sensation (vertigo)    Weakness in an arm or leg or on one side of the face    Trouble speaking or seeing   Date Last Reviewed: 1/1/2017 2000-2017 [employer]. [address] [place], [place] [address]. All rights reserved. This information is not intended as a substitute for professional medical care. Always follow your healthcare professional's instructions.         Colorectal Cancer Screening    Colorectal cancer (cancer in the colon or rectum) is a leading cause of cancer deaths in the [place] But it doesn t have to be. When this cancer is found and removed early, the chances of a full recovery are very good. Because colorectal cancer rarely causes symptoms in its early stages, screening for the disease is important. It s even more crucial if you have risk factors for the disease. Learn more about colorectal cancer and its risk factors. Then talk to your healthcare provider about being screened. You could be saving your own life.  Risk factors for colorectal cancer  Your risk of having colorectal cancer increases if you:    Are 50 years of age or older    Have a family history or personal history of colorectal cancer or polyps    Have a personal history of type 2 diabetes, Crohn s disease, or ulcerative colitis    Have an inherited genetic syndrome like Koch syndrome (also known as HNPCC) or familial adenomatous polyposis (FAP)    Are very overweight    Are not physically active    Smoke    Drink a lot of alcohol    Eat a lot of red or processed meat  The colon and rectum  Waste from food you eat enters the colon from the small intestine. As it travels through the colon, the waste (stool) loses water and becomes more solid. Intestinal muscles push it toward the sigmoid--the last section of the  colon. Stool then moves into the rectum, where it s stored until it s ready to leave the body during a bowel movement.  How cancer develops  Polyps are growths that form on the inner lining of the colon or rectum. Most are benign, which means they aren t cancerous. But over time, some polyps can become cancer (malignant). This happens when cells in these polyps begin growing abnormally. In time, malignant cells invade more and more of the colon and rectum. The cancer may also spread to nearby organs or lymph nodes or to other parts of the body. Finding and removing polyps can help prevent cancer from ever forming.  Your screening  Screening means looking for a health problem before you have symptoms. During screening for colorectal cancer, your healthcare provider will ask about your health history, examine you, and do one or more tests.  History and exam  The history and exam involve the following:    Health history. Your healthcare provider will ask about your health history. Mention if a family member has had colon cancer or polyps. Also mention any health problems you have had in the past.    Digital rectal exam (MIGUEL). During a MIGUEL, the healthcare provider inserts a lubricated gloved finger into the rectum. The test is painless and takes less than a minute. Healthcare providers agree that this test alone is not enough to screen for colorectal cancer.  Screening test choices  Fecal occult blood test (FOBT) or fecal immunochemical test (FIT)  These tests check for occult blood in stool (blood you can t see). Hidden blood may be a sign of colon polyps or cancer. A small sample of stool is tested for blood in a laboratory. Most often, you collect this sample at home using a kit your healthcare provider gives you. Follow the instructions carefully for using this kit. You might need to avoid certain foods and medicines before the test, as directed.  Barium enema with contrast (double-contrast barium enema)  This test  uses X-rays to provide images of the entire colon and rectum. The day before this test, you will need to do a bowel prep to clean out the colon and rectum. A bowel prep is a liquid diet plus strong laxatives or enemas. You will be awake for the test, but you may be given medicine to help you relax. At the start of the test, a radiologist (a healthcare provider who specializes in imaging tests) places a soft tube into the rectum. The tube is used to fill the colon with a contrast liquid (barium) and air. This can be uncomfortable for some people. The liquid helps the colon show up clearly on the X-rays. Because the test uses X-rays, it exposes you to a small amount of radiation.  Virtual colonoscopy  This exam is also called a CT colonography. It uses a series of X-ray photographs to create a 3-D view of the colon and rectum. The day before the test, you will need to do a bowel prep to clean out your colon. Your healthcare provider will give you instructions on how to do this. During the procedure, you will lie on a table that is part of a special X-ray machine called a CT scanner. A small tube will be placed into your rectum to fill the colon and rectum with air. This can be uncomfortable for some people. Then, the table will move into the machine and pictures will be taken of your colon and rectum. A computer will combine these photos to create a 3-D picture. Because the test uses X-rays, it exposes you to a small amount of radiation.  Scope exams  Here are two types of scope exams:    Colonoscopy. This test can be used to find and remove polyps anywhere in the colon or rectum. The day before the test, you will do a bowel prep. This is a liquid diet plus a strong laxative solution or an enema. The bowel prep will cleanse your colon. You will be given instructions for this. Just before the test, you are given a medicine to make you sleepy. Then, a long, flexible, lighted tube called a colonoscope is gently inserted  into the rectum and guided through the entire colon. Images of the colon are viewed on a video screen. Any polyps that are found are removed and sent to a lab for testing. If a polyp can t be removed, a sample of tissue is taken and the polyp might be removed later during surgery. You will need to bring someone with you to drive you home after this test.     Sigmoidoscopy. This test is similar to colonoscopy, but focuses only on the sigmoid colon and rectum. As with colonoscopy, bowel prep must be done the day before this test. It might not need to be as complete as the bowel prep for a colonoscopy. You are awake during the procedure, but you may be given medicine to help you relax. During the test, the healthcare provider guides a thin, flexible, lighted tube called a sigmoidoscope through your rectum and lower colon. The images are displayed on a video screen. Polyps are removed, if possible, and sent to a lab for testing.  Colonoscopy is the only screening test that lets your healthcare provider see the entire colon and rectum. This test also lets your healthcare provider remove any pieces of tissue that need to be looked at by a lab. If something suspicious is found using any other tests, you will likely need a colonoscopy.     When to call your healthcare provider after a test  Call your healthcare provider if you have any of the following after any screening test:    Bleeding    Fever of 100.4 F (38 C) or higher, or as directed by your healthcare provider    Abdominal pain    Vomiting   Date Last Reviewed: 11/4/2015 2000-2017 [employer]. [address] [place], [place] [address]. All rights reserved. This information is not intended as a substitute for professional medical care. Always follow your healthcare professional's instructions.      PGen Hypertension Study Summary     Thank you for your interest in the PGen hypertension research study. This study is designed to test whether  genetically guided blood pressure medication management is better than the standard of care.  Both groups will use medications that have been used for many years to treat high blood pressure ( diuretics, beta blockers, calcium channel blockers, AceI /ARB).  The ORDER of medications chosen may be different however. All participants will receive the genetic testing for free along with free research related visits.  Participants will not be given the results of their genetic test results until the END of the study.Participants will also receive compensation totaling about $100 for completing all study visits and surveys.      If you would like to enroll or know more about using your genetics to determine which hypertensive medications may work best for you please contact the research coordinator as [phone] or email [email or web address]    Who may qualify for the study:  1) New diagnosis of high blood pressure but not yet on blood pressure medication.  2) Existing diagnosis of hypertension but blood pressure is  uncontrolled with 1 or less class of medications.   3) Age between 30 and 70  4) BMI between 19-50    Who should NOT be in the study:    1) All patient taking more than 1 class of hypertensive medication are excluded.   2) Documented instance of following conditions    A. Cardiac Disease  i. ICD-10 Code for Coronary Artery Disease - CAD: 410.* -414.*, I25.*  ii. ICD-10 Code for Heart Failure - 428.*, I50.*  iii. ICD-10 Code for Congenital Cardiac Disease - 745.*, -747.*, Q20.*, -Q28.*   B. Kidney Disease        i. ICD-10 Code for Chronic Kidney Disease - CKD:ICD9 585.*and ICD10 N18.*   C. Vascular Disease        i. ICD-10 Code for Peripheral Vascular Disease - 443.9, I73.9        ii. ICD-10 Code for Pulmonary Hypertension - 416.0, 416.8, I27.0, I27.2   D. Secondary Hypertension                     i. ICD-10 Code for Hypertension Secondary to Other Diseases - I15.0-2, I15.8-9  3) Any patient who has  chronic medical conditions that  their doctor/provider feels would make them unsafe to participate in a research study where initial choice of blood pressure  medication could be from 1 of these 4 BP classes of medicines: diuretics, beta blockers, calcium channel blockers, AceI /ARB.    Study visit occur at the following clinic locations  [place]:  1) [place]  2) [place]  3) [place]  4) [place]  5) [place]  6) [place]  7) [place]    [place]:  1) [facility]  2) [place]  [facility]) [facility]  1) [place]    Patient Expectations:    1) Take Hypertension medications  2) Attend scheduled study visits  3) Complete online surveys sent between visits     If enrolled patient is asked to attend 5 to 8 study visits over the next 12 months.             Follow-ups after your visit        Additional Services     GASTROENTEROLOGY ADULT REF PROCEDURE ONLY [facility] [phone]; [facility]       Last Lab Result: Creatinine (mg/dL)       Date                     Value                 02/25/2012               1.16             ----------  Body mass index is 36.15 kg/(m^2).     Needed:  No  Language:  English    Patient will be contacted to schedule the colonoscopy, or call the Specialty Scheduling Line [phone].      Please be aware that coverage of these services is subject to the terms and limitations of your health insurance plan.  Call member services at your health plan with any benefit or coverage questions.  Any procedures must be performed at a [facility] facility OR coordinated by your clinic's referral office.    Please bring the following with you to your appointment:    (1) Any X-Rays, CTs or MRIs which have been performed.  Contact the facility where they were done to arrange for  prior to your scheduled appointment.    (2) List of current medications   (3) This referral request   (4) Any documents/labs given to you for this referral             GASTROENTEROLOGY ADULT REF PROCEDURE ONLY [facility] [phone]; [facility]       Last Lab Result: Creatinine (mg/dL)       Date                     Value                 02/25/2012               1.16             ----------  Body mass index is 36.15 kg/(m^2).     Needed:  No  Language:  English    Patient will be contacted to schedule the upper endoscopy (EGD), or call the Specialty Scheduling Line [phone].      Please be aware that coverage of these services is subject to the terms and limitations of your health insurance plan.  Call member services at your health plan with any benefit or coverage questions.  Any procedures must be performed at a [facility] facility OR coordinated by your clinic's referral office.    Please bring the following with you to your appointment:    (1) Any X-Rays, CTs or MRIs which have been performed.  Contact the facility where they were done to arrange for  prior to your scheduled appointment.    (2) List of current medications   (3) This referral request   (4) Any documents/labs given to you for this referral                  Follow-up notes from your care team     Return in about 3 months (around 7/13/2018) for blood pressure check.      Who to contact     If you have questions or need follow up information about today's clinic visit or your schedule please contact [facility] directly at [phone].  Normal or non-critical lab and imaging results will be communicated to you by MyChart, letter or phone within 4 business days after the clinic has received the results. If you do not hear from us within 7 days, please contact the clinic through MyChart or phone. If you have a critical or abnormal lab result, we will notify you by phone as soon as possible.  Submit refill requests through [employer] or call your pharmacy and they will forward the refill request to us. Please allow 3 business days for your refill to be  "completed.          Additional Information About Your Visit        [employer] Information     [employer] lets you send messages to your doctor, view your test results, renew your prescriptions, schedule appointments and more. To sign up, go to [email or web address] . Click on \"Log in\" on the left side of the screen, which will take you to the Welcome page. Then click on \"Sign up Now\" on the right side of the page.     You will be asked to enter the access code listed below, as well as some personal information. Please follow the directions to create your username and password.     Your access code is: [email or web address]  Expires: 2018  4:59 PM     Your access code will  in 90 days. If you need help or a new code, please call your [place] clinic or [phone].        Care EveryWhere ID     This is your Care EveryWhere ID. This could be used by other organizations to access your [place] medical records  [id number]        Your Vitals Were     Pulse Temperature Height Pulse Oximetry BMI (Body Mass Index)       94 98.4  F (36.9  C) (Oral) 1.676 m (5' 6\") 95% 36.15 kg/m2        Blood Pressure from Last 3 Encounters:   18 (!) 153/100   17 (!) 150/104   16 (!) 155/99    Weight from Last 3 Encounters:   18 101.6 kg (224 lb)   17 98 kg (216 lb)   16 100.7 kg (222 lb)              We Performed the Following     ALT     Basic metabolic panel     CBC with platelets     GASTROENTEROLOGY ADULT REF PROCEDURE ONLY [facility] [phone]; [facility]     GASTROENTEROLOGY ADULT REF PROCEDURE ONLY [facility] [phone]; [facility]     Hepatitis C Screen Reflex to HCV RNA Quant and Genotype     Lipid panel reflex to direct LDL Non-fasting     Prostate spec antigen screen     Uric acid        Primary Care Provider Office Phone # Fax #    Lencho Montemayor [phone]       [facility] [address] [place] " [address]        Equal Access to Services     [facility]: Hadii aad ku hadbethlouis Bruceali, wajacquieda luqadaha, qagenovevata crbritneycarlos theodore, tracy polanco. So [facility] [phone].    ATENCIÓN: Si habla español, tiene a manning disposición servicios gratuitos de asistencia lingüística. Elviaame al [phone].    We comply with applicable federal civil rights laws and [place] laws. We do not discriminate on the basis of race, color, national origin, age, disability, sex, sexual orientation, or gender identity.            Thank you!     Thank you for choosing [facility]  for your care. Our goal is always to provide you with excellent care. Hearing back from our patients is one way we can continue to improve our services. Please take a few minutes to complete the written survey that you may receive in the mail after your visit with us. Thank you!             Your Updated Medication List - Protect others around you: Learn how to safely use, store and throw away your medicines at www.disposemymeds.org.          This list is accurate as of 4/13/18  4:59 PM.  Always use your most recent med list.                   Brand Name Dispense Instructions for use Diagnosis    allopurinol 300 MG tablet    ZYLOPRIM    30 tablet    Take 1 tablet (300 mg) by mouth daily    Elevated uric acid in blood       colchicine 0.6 MG tablet     30 tablet    TAKE 2 TABLETS BY MOUTH IMMEDIATELY, THEN 1 TABLET 1 HOUR LATER AS NEEDED. MAY TAKE 1 TABLET EVERY 12 HOURS AS NEEDED AFTER THAT    Gout, unspecified cause, unspecified chronicity, unspecified site       indomethacin 50 MG capsule    INDOCIN    42 capsule    Take 1 capsule (50 mg) by mouth 3 times daily (with meals)    Acute gouty arthritis

## 2018-04-16 LAB — HCV AB SERPL QL IA: NONREACTIVE

## 2018-04-16 PROCEDURE — 82274 ASSAY TEST FOR BLOOD FECAL: CPT | Performed by: FAMILY MEDICINE

## 2018-04-17 ENCOUNTER — DOCUMENTATION ONLY (OUTPATIENT)
Dept: LAB | Facility: CLINIC | Age: 58
End: 2018-04-17

## 2018-04-17 DIAGNOSIS — Z00.00 ROUTINE GENERAL MEDICAL EXAMINATION AT A HEALTH CARE FACILITY: ICD-10-CM

## 2018-04-17 DIAGNOSIS — Z12.11 SPECIAL SCREENING FOR MALIGNANT NEOPLASMS, COLON: Primary | ICD-10-CM

## 2018-04-17 DIAGNOSIS — Z12.11 COLON CANCER SCREENING: Primary | ICD-10-CM

## 2018-04-18 LAB — HEMOCCULT STL QL IA: NEGATIVE

## 2018-04-23 ENCOUNTER — TELEPHONE (OUTPATIENT)
Dept: FAMILY MEDICINE | Facility: CLINIC | Age: 58
End: 2018-04-23

## 2018-04-23 DIAGNOSIS — K62.5 RECTAL BLEEDING: ICD-10-CM

## 2018-04-23 DIAGNOSIS — R10.33 PERIUMBILICAL PAIN: Primary | ICD-10-CM

## 2018-04-23 NOTE — TELEPHONE ENCOUNTER
Disregard order. They scheduled from previous order at [facility] location  Mike Flores CMA

## 2018-04-23 NOTE — TELEPHONE ENCOUNTER
Reason for Call: Request for an order or referral:    Order or referral being requested: Order    Date needed: 04/23/2018    Has the patient been seen by the PCP for this problem? YES    Additional comments: Please send Colonoscopy order to [facility] in [place] today. Patient is having some bleeding and need to get order in today. [place] [facility] phone number is [phone]     Phone number Patients daughter can be reached at:  [phone]    Best Time:  Anytime     Can we leave a detailed message on this number?  YES    Call taken on 4/23/2018 at 11:32 AM by Marisol Salas

## 2018-05-11 ENCOUNTER — HOSPITAL ENCOUNTER (OUTPATIENT)
Facility: AMBULATORY SURGERY CENTER | Age: 58
Discharge: HOME | End: 2018-05-11
Attending: INTERNAL MEDICINE | Admitting: INTERNAL MEDICINE
Payer: COMMERCIAL

## 2018-05-11 ENCOUNTER — SURGERY (OUTPATIENT)
Age: 58
End: 2018-05-11

## 2018-05-11 VITALS
HEIGHT: 66 IN | BODY MASS INDEX: 36 KG/M2 | RESPIRATION RATE: 16 BRPM | SYSTOLIC BLOOD PRESSURE: 141 MMHG | WEIGHT: 224 LBS | TEMPERATURE: 97.1 F | DIASTOLIC BLOOD PRESSURE: 106 MMHG | OXYGEN SATURATION: 92 %

## 2018-05-11 LAB
COLONOSCOPY: NORMAL
UPPER GI ENDOSCOPY: NORMAL

## 2018-05-11 PROCEDURE — 45385 COLONOSCOPY W/LESION REMOVAL: CPT

## 2018-05-11 PROCEDURE — G8907 PT DOC NO EVENTS ON DISCHARG: HCPCS

## 2018-05-11 PROCEDURE — G8918 PT W/O PREOP ORDER IV AB PRO: HCPCS

## 2018-05-11 PROCEDURE — 88305 TISSUE EXAM BY PATHOLOGIST: CPT | Performed by: INTERNAL MEDICINE

## 2018-05-11 RX ORDER — LIDOCAINE 40 MG/G
CREAM TOPICAL
Status: DISCONTINUED | OUTPATIENT
Start: 2018-05-11 | End: 2018-05-12 | Stop reason: HOSPADM

## 2018-05-11 RX ORDER — FENTANYL CITRATE 50 UG/ML
INJECTION, SOLUTION INTRAMUSCULAR; INTRAVENOUS PRN
Status: DISCONTINUED | OUTPATIENT
Start: 2018-05-11 | End: 2018-05-11 | Stop reason: HOSPADM

## 2018-05-11 RX ORDER — ONDANSETRON 2 MG/ML
4 INJECTION INTRAMUSCULAR; INTRAVENOUS
Status: DISCONTINUED | OUTPATIENT
Start: 2018-05-11 | End: 2018-05-12 | Stop reason: HOSPADM

## 2018-05-11 RX ADMIN — FENTANYL CITRATE 100 MCG: 50 INJECTION, SOLUTION INTRAMUSCULAR; INTRAVENOUS at 07:03

## 2018-05-15 LAB — COPATH REPORT: NORMAL

## 2020-07-25 ENCOUNTER — OFFICE VISIT (OUTPATIENT)
Dept: URGENT CARE | Facility: URGENT CARE | Age: 60
End: 2020-07-25
Payer: COMMERCIAL

## 2020-07-25 ENCOUNTER — NURSE TRIAGE (OUTPATIENT)
Dept: NURSING | Facility: CLINIC | Age: 60
End: 2020-07-25

## 2020-07-25 VITALS
WEIGHT: 221.4 LBS | HEART RATE: 96 BPM | TEMPERATURE: 98.9 F | DIASTOLIC BLOOD PRESSURE: 126 MMHG | OXYGEN SATURATION: 96 % | RESPIRATION RATE: 24 BRPM | BODY MASS INDEX: 35.75 KG/M2 | SYSTOLIC BLOOD PRESSURE: 166 MMHG

## 2020-07-25 DIAGNOSIS — E79.0 ELEVATED URIC ACID IN BLOOD: ICD-10-CM

## 2020-07-25 DIAGNOSIS — M10.9 ACUTE GOUTY ARTHRITIS: ICD-10-CM

## 2020-07-25 PROCEDURE — 99213 OFFICE O/P EST LOW 20 MIN: CPT | Performed by: FAMILY MEDICINE

## 2020-07-25 RX ORDER — ALLOPURINOL 300 MG/1
300 TABLET ORAL DAILY
Qty: 30 TABLET | Refills: 1 | Status: SHIPPED | OUTPATIENT
Start: 2020-07-25 | End: 2021-01-27

## 2020-07-25 RX ORDER — COLCHICINE 0.6 MG/1
0.6 TABLET ORAL DAILY
Qty: 30 TABLET | Refills: 0 | Status: SHIPPED | OUTPATIENT
Start: 2020-07-25 | End: 2021-01-27

## 2020-07-25 RX ORDER — INDOMETHACIN 50 MG/1
50 CAPSULE ORAL
Qty: 42 CAPSULE | Refills: 1 | Status: SHIPPED | OUTPATIENT
Start: 2020-07-25 | End: 2021-01-27

## 2020-07-25 ASSESSMENT — PAIN SCALES - GENERAL: PAINLEVEL: MODERATE PAIN (4)

## 2020-07-25 NOTE — PROGRESS NOTES
SUBJECTIVE:  Chief Complaint   Patient presents with     Musculoskeletal Problem     Left ankle is swollen and painful. Started last Wednesday.     Cydney Flores is a 60 year old male who presents with a chief complaint of left ankle lateral malleolus pain, swelling, tenderness and decreased range of motion.  Symptoms began 3 day(s) ago, are moderate and sudden onset, still present and constant  has a history of gout -    Context:-  He ran out of his allopurinol and meds for acute treatment  There was no injury noted to the painful area       Pain exacerbated by walking, weight-bearing and flexion/extension Relieved by nothing.  He treated it initially with no therapy.             Patient Active Problem List   Diagnosis     Idiopathic chronic gout of multiple sites without tophus     CARDIOVASCULAR SCREENING; LDL GOAL LESS THAN 130     Essential hypertension with goal blood pressure less than 140/90     Severe obesity (BMI 35.0-39.9) with comorbidity (H)       ALLERGIES:  Patient has no known allergies.    MEDs  No current outpatient medications on file prior to visit.  No current facility-administered medications on file prior to visit.       Social History     Tobacco Use     Smoking status: Never Smoker     Smokeless tobacco: Never Used   Substance Use Topics     Alcohol use: Yes     Comment: occasional, social       Family History   Problem Relation Age of Onset     Diabetes Mother      Hypertension No family hx of      Coronary Artery Disease No family hx of      Cerebrovascular Disease No family hx of      Cancer No family hx of          ROS:  CONSTITUTIONAL:NEGATIVE for fever, chills,    INTEGUMENTARY/SKIN: NEGATIVE for worrisome rashes,  or lesions  EYES: NEGATIVE for vision changes or irritation  ENT/MOUTH: NEGATIVE for ear, mouth and throat problems  RESP:NEGATIVE for significant cough or SOB  GI: NEGATIVE for nausea, abdominal pain, or change in bowel habits    EXAM:   BP (!) 166/126 (BP Location: Left arm,  Patient Position: Sitting, Cuff Size: Adult Large)   Pulse 96   Temp 98.9  F (37.2  C) (Tympanic)   Resp 24   Wt 100.4 kg (221 lb 6.4 oz)   SpO2 96%   BMI 35.75 kg/m      left ankle, lateral malleolus  No injury to the skin noted  No contusions noted  Redness, tenderness, warmth and swelling noted in the region of the  Lateral malleolus ankle joint  No pain or tenderness with palpation and range of motion of the remaining toes, MTP joints, or medial malleolus ankle joint of the left foot/ ankle.  No pain or tenderness with palpation of the contralateral foot, ankle  No pain or tenderness with palpation of the bilateral knees,      EYES:   EOMI,   conjunctiva clear  HENT:   External ears with no swelling or lesions   Nose and lips without  Swelling, ulcers, erythema or lesions  NECK:   normal pain free ROM  RESP:   no labored respirations, no tachypnea  EXTREMITIES:   Full ROM without expression of pain or limitation x 3 remaining extremities  NEURO:   Normal strength and tone, ambulation without difficulty,   normal speech and mentation  SKIN:  no suspicious lesions or rashes  PSYCH:   mentation and affect appears normal and patient appearance--appropriately groomed       ASSESSMENT:  Acute gouty arthritis     - indomethacin (INDOCIN) 50 MG capsule; Take 1 capsule (50 mg) by mouth 3 times daily (with meals)  - colchicine (COLCYRS) 0.6 MG tablet; Take 1 tablet (0.6 mg) by mouth daily    Elevated uric acid in blood     - allopurinol (ZYLOPRIM) 300 MG tablet; Take 1 tablet (300 mg) by mouth daily     Acetaminophen/ Ibuprofen as an alternative for pain  Given patient education materials about gout     Follow-up with primary care physician concerning ongoing care of gout

## 2020-07-25 NOTE — TELEPHONE ENCOUNTER
"Cydney calls with concerns about colchicine prescription.    FNA called [facility], staff would like to clarify order for colchicine:     Disp  Refills  Start  End  SHAYNA    colchicine (COLCYRS) 0.6 MG tablet  30 tablet  0  7/25/2020   --    Sig - Route: Take 1 tablet (0.6 mg) by mouth daily - Oral    Sent to pharmacy as: Colchicine 0.6 MG Oral Tablet (COLCYRS)    Class: E-Prescribe    Notes to Pharmacy: Two tabs day 1 then 1 additional tab after 2 hours if persistent pain      FNA called [facility] to clarify orders. Spoke with Idalia CHOU over the phone and via [employer] messaging. Idalia CHOU clarified per Dr. Yeh that \"PRN 2tabs daily + 1 tablet. All if it is PRN. When he has pain he takes 2 tablet\" FNA requested to have Idalia CHOU call [facility] to clarify and she verbalized agreement.    Sonia White RN/[place] Nurse Advisor        Reason for Disposition    Pharmacy calling with prescription questions and triager unable to answer question    Protocols used: MEDICATION QUESTION CALL-A-AH      "

## 2021-01-27 ENCOUNTER — OFFICE VISIT (OUTPATIENT)
Dept: FAMILY MEDICINE | Facility: CLINIC | Age: 61
End: 2021-01-27
Payer: COMMERCIAL

## 2021-01-27 VITALS
BODY MASS INDEX: 35.69 KG/M2 | DIASTOLIC BLOOD PRESSURE: 90 MMHG | OXYGEN SATURATION: 96 % | WEIGHT: 221 LBS | TEMPERATURE: 98 F | SYSTOLIC BLOOD PRESSURE: 138 MMHG | HEART RATE: 94 BPM

## 2021-01-27 DIAGNOSIS — R10.13 ABDOMINAL PAIN, EPIGASTRIC: Primary | ICD-10-CM

## 2021-01-27 DIAGNOSIS — M1A.00X0 IDIOPATHIC CHRONIC GOUT WITHOUT TOPHUS, UNSPECIFIED SITE: ICD-10-CM

## 2021-01-27 LAB
ALBUMIN SERPL-MCNC: 3.6 G/DL (ref 3.4–5)
ALP SERPL-CCNC: 105 U/L (ref 40–150)
ALT SERPL W P-5'-P-CCNC: 69 U/L (ref 0–70)
ANION GAP SERPL CALCULATED.3IONS-SCNC: 6 MMOL/L (ref 3–14)
AST SERPL W P-5'-P-CCNC: 121 U/L (ref 0–45)
BILIRUB SERPL-MCNC: 0.3 MG/DL (ref 0.2–1.3)
BUN SERPL-MCNC: 30 MG/DL (ref 7–30)
CALCIUM SERPL-MCNC: 9.1 MG/DL (ref 8.5–10.1)
CHLORIDE SERPL-SCNC: 113 MMOL/L (ref 94–109)
CO2 SERPL-SCNC: 24 MMOL/L (ref 20–32)
CREAT SERPL-MCNC: 1.7 MG/DL (ref 0.66–1.25)
ERYTHROCYTE [DISTWIDTH] IN BLOOD BY AUTOMATED COUNT: 11.7 % (ref 10–15)
GFR SERPL CREATININE-BSD FRML MDRD: 43 ML/MIN/{1.73_M2}
GLUCOSE SERPL-MCNC: 96 MG/DL (ref 70–99)
HCT VFR BLD AUTO: 40 % (ref 40–53)
HGB BLD-MCNC: 13.8 G/DL (ref 13.3–17.7)
LIPASE SERPL-CCNC: 266 U/L (ref 73–393)
MCH RBC QN AUTO: 30.3 PG (ref 26.5–33)
MCHC RBC AUTO-ENTMCNC: 34.5 G/DL (ref 31.5–36.5)
MCV RBC AUTO: 88 FL (ref 78–100)
PLATELET # BLD AUTO: 307 10E9/L (ref 150–450)
POTASSIUM SERPL-SCNC: 3.9 MMOL/L (ref 3.4–5.3)
PROT SERPL-MCNC: 8.5 G/DL (ref 6.8–8.8)
RBC # BLD AUTO: 4.56 10E12/L (ref 4.4–5.9)
SODIUM SERPL-SCNC: 143 MMOL/L (ref 133–144)
WBC # BLD AUTO: 8.1 10E9/L (ref 4–11)

## 2021-01-27 PROCEDURE — 85027 COMPLETE CBC AUTOMATED: CPT | Performed by: FAMILY MEDICINE

## 2021-01-27 PROCEDURE — 36415 COLL VENOUS BLD VENIPUNCTURE: CPT | Performed by: FAMILY MEDICINE

## 2021-01-27 PROCEDURE — 80053 COMPREHEN METABOLIC PANEL: CPT | Performed by: FAMILY MEDICINE

## 2021-01-27 PROCEDURE — 83690 ASSAY OF LIPASE: CPT | Performed by: FAMILY MEDICINE

## 2021-01-27 PROCEDURE — 99214 OFFICE O/P EST MOD 30 MIN: CPT | Performed by: FAMILY MEDICINE

## 2021-01-27 RX ORDER — PANTOPRAZOLE SODIUM 40 MG/1
40 TABLET, DELAYED RELEASE ORAL DAILY
Qty: 90 TABLET | Refills: 3 | Status: SHIPPED | OUTPATIENT
Start: 2021-01-27 | End: 2021-06-18

## 2021-01-27 RX ORDER — INDOMETHACIN 50 MG/1
50 CAPSULE ORAL 3 TIMES DAILY PRN
Qty: 42 CAPSULE | Refills: 1 | Status: SHIPPED | OUTPATIENT
Start: 2021-01-27 | End: 2021-06-18

## 2021-01-27 RX ORDER — ALLOPURINOL 300 MG/1
300 TABLET ORAL DAILY
Qty: 90 TABLET | Refills: 3 | Status: CANCELLED | OUTPATIENT
Start: 2021-01-27

## 2021-01-27 ASSESSMENT — PAIN SCALES - GENERAL: PAINLEVEL: NO PAIN (0)

## 2021-01-27 NOTE — PATIENT INSTRUCTIONS
At [facility], we strive to deliver an exceptional experience to you, every time we see you. If you receive a survey, please complete it as we do value your feedback.  If you have MyChart, you can expect to receive results automatically within 24 hours of their completion.  Your provider will send a note interpreting your results as well.   If you do not have MyChart, you should receive your results in about a week by mail.    Your care team:                            Family Medicine Internal Medicine   MD Xander Goldstein MD Shantel Branch-Fleming, MD Srinivasa Vaka, MD Katya Belousova, PADEBO Sumner, APRN CNP    Wilton Ruiz, MD Pediatrics   Prosper Ortiz, PADEBO Amato, CNP MD Raisa Andersen APRN CNP   MD Judith Samano MD Deborah Mielke, MD Giana Bueno, APRN [facility]      Clinic hours: Monday - Thursday 7 am-6 pm; Fridays 7 am-5 pm.   Urgent care: Monday - Friday 11 am-9 pm; Saturday and Sunday 9 am-5 pm.    Clinic: [phone]       [facility]: Monday - Thursday 8 am - 7 pm; Friday 8 am - 6 pm  [facility]: [phone]     Use www.[email or web address].org for 24/7 diagnosis and treatment of dozens of conditions.

## 2021-01-27 NOTE — PROGRESS NOTES
Neto Lamas is a 60 year old who presents to clinic today for the following health issues:    HPI       Abdominal/Flank Pain  Onset/Duration: >2 weeks  Description:   Character: Dull ache, patient describes it like a heartburn  Location: epigastric region  Radiation: None  Intensity: severe  Progression of Symptoms:  intermittent  Accompanying Signs & Symptoms:  Fever/Chills: no  Gas/Bloating: YES  Nausea: no  Vomitting: no  Diarrhea: no  Constipation: no  Dysuria or Hematuria: no  History:   Trauma: no  Previous similar pain: YES  Previous tests done: Upper Endoscopy  Precipitating factors:   Does the pain change with:     Food: YES, he notices more with spicy food, but pain is still intermittent with other foods    Bowel Movement: YES, minor relief    Urination: no   Other factors:  no  Therapies tried and outcome: None      Review of Systems   Constitutional, HEENT, cardiovascular, pulmonary, GI, , musculoskeletal, neuro, skin, endocrine and psych systems are negative, except as otherwise noted.      Objective    BP (!) 138/90   Pulse 94   Temp 98  F (36.7  C) (Oral)   Wt 100.2 kg (221 lb)   SpO2 96%   BMI 35.69 kg/m    Body mass index is 35.69 kg/m .  Physical Exam   GENERAL: healthy, alert and no distress  NECK: no adenopathy, no asymmetry, masses, or scars and thyroid normal to palpation  RESP: lungs clear to auscultation - no rales, rhonchi or wheezes  CV: regular rate and rhythm, normal S1 S2, no S3 or S4, no murmur, click or rub, no peripheral edema and peripheral pulses strong  ABDOMEN: mild epigastric tenderness, no r/r/g  MS: no gross musculoskeletal defects noted, no edema    A/P:  (R10.13) Abdominal pain, epigastric  (primary encounter diagnosis)  Comment: gastritis? PUD? Pancreatitis? R/o h pylori  Plan: Helicobacter pylori Antigen Stool,         Comprehensive metabolic panel (BMP + Alb, Alk         Phos, ALT, AST, Total. Bili, TP), Lipase, CBC         with platelets, pantoprazole  (PROTONIX) 40 MG         EC tablet        Check labs above. Start ppi daily. Discussed refraining for alcohol usage.    (M1A.00X0) Idiopathic chronic gout without tophus, unspecified site  Comment:   Plan: indomethacin (INDOCIN) 50 MG capsule        Needed refills to use as needed. Patient does not want chronic uric-acid lowering medication at this time. Discussed gout diet.    Wilton Ruiz MD

## 2021-01-28 DIAGNOSIS — R10.13 ABDOMINAL PAIN, EPIGASTRIC: ICD-10-CM

## 2021-01-28 DIAGNOSIS — A04.8 H. PYLORI INFECTION: Primary | ICD-10-CM

## 2021-01-28 PROCEDURE — 87338 HPYLORI STOOL AG IA: CPT | Performed by: FAMILY MEDICINE

## 2021-02-01 LAB — H PYLORI AG STL QL IA: POSITIVE

## 2021-02-02 RX ORDER — AMOXICILLIN 500 MG/1
500 CAPSULE ORAL 2 TIMES DAILY
Qty: 28 CAPSULE | Refills: 0 | Status: SHIPPED | OUTPATIENT
Start: 2021-02-02 | End: 2021-02-16

## 2021-02-02 RX ORDER — CLARITHROMYCIN 500 MG
500 TABLET ORAL 2 TIMES DAILY
Qty: 28 TABLET | Refills: 0 | Status: SHIPPED | OUTPATIENT
Start: 2021-02-02 | End: 2021-02-16

## 2021-04-18 ENCOUNTER — OFFICE VISIT (OUTPATIENT)
Dept: URGENT CARE | Facility: URGENT CARE | Age: 61
End: 2021-04-18
Payer: COMMERCIAL

## 2021-04-18 VITALS
SYSTOLIC BLOOD PRESSURE: 160 MMHG | BODY MASS INDEX: 36.88 KG/M2 | OXYGEN SATURATION: 97 % | HEART RATE: 106 BPM | RESPIRATION RATE: 24 BRPM | DIASTOLIC BLOOD PRESSURE: 118 MMHG | WEIGHT: 228.4 LBS | TEMPERATURE: 98.4 F

## 2021-04-18 DIAGNOSIS — R03.0 ELEVATED BLOOD PRESSURE READING WITHOUT DIAGNOSIS OF HYPERTENSION: ICD-10-CM

## 2021-04-18 DIAGNOSIS — I16.0 HYPERTENSIVE URGENCY: ICD-10-CM

## 2021-04-18 DIAGNOSIS — M10.9 GOUTY ARTHRITIS OF LEFT GREAT TOE: Primary | ICD-10-CM

## 2021-04-18 PROCEDURE — 99214 OFFICE O/P EST MOD 30 MIN: CPT | Performed by: PHYSICIAN ASSISTANT

## 2021-04-18 RX ORDER — COLCHICINE 0.6 MG/1
TABLET ORAL
Qty: 15 TABLET | Refills: 0 | Status: SHIPPED | OUTPATIENT
Start: 2021-04-18 | End: 2021-06-18

## 2021-04-18 RX ORDER — ALLOPURINOL 300 MG/1
300 TABLET ORAL DAILY
Qty: 30 TABLET | Refills: 1 | Status: SHIPPED | OUTPATIENT
Start: 2021-04-18 | End: 2021-05-11

## 2021-04-18 ASSESSMENT — PAIN SCALES - GENERAL: PAINLEVEL: EXTREME PAIN (9)

## 2021-04-18 NOTE — PROGRESS NOTES
Chief Complaint   Patient presents with     Arthritis     started last Sunday-gout on big toe on left foot          Medical Decision Making:    Differential Diagnosis:  MS Injury Pain: tendonitis, contusion, gout, septic arthritis and osteoarthritis      ASSESSMENT:    ICD-10-CM    1. Gouty arthritis of left great toe  M10.9 allopurinol (ZYLOPRIM) 300 MG tablet     colchicine (COLCYRS) 0.6 MG tablet   2. Hypertensive urgency  I16.0    3. Elevated blood pressure reading without diagnosis of hypertension  R03.0          PLAN: Gout flare.  Declines indomethacin.  I do not want to give prednisone with his elevated blood pressure.  I will give the Colcrys.  Allopurinol should not be started until the flare has resolved.  He understands this.  I told him that my main concern is with his elevated blood pressure.  I consider it hypertensive urgency.  He declines medication for his blood pressure.  We did discuss the fact that hypertension is the #1 cause of stroke.  It can also cause vision loss.  I strongly encouraged him to follow-up this coming week with his primary to discuss his blood pressure.      Narcisa Bryant PA-C        SUBJECTIVE:  Cydney Flores is an 60 year old male who presents for refills of his gout medication.  Several days with left great toe pain and tenderness.  Classic symptoms for his usual gout.  Ran out of allopurinol, Indocin and Colcrys.  Does not want the Indocin stating it does not work.  I note his blood pressure is extremely elevated here today.  He denies any headache.  No chest pain or shortness of breath.  He has had several elevated blood pressures in the past after reviewing his chart.  He states he has no official diagnosis of hypertension.  He told me that other providers have recommended he start blood pressure medication but he does not want to.  No fever.      History   Smoking Status     Never Smoker   Smokeless Tobacco     Never Used       ROS:  GEN no fevers  SKIN no  erythema  Musculoskeletal:  See HPI.      OBJECTIVE:  Blood pressure (!) 160/118, pulse 106, temperature 98.4  F (36.9  C), temperature source Oral, resp. rate 24, weight 103.6 kg (228 lb 6.4 oz), SpO2 97 %.   Initial BP here was 164/123  Patient is alert and NAD.  EYES: conjunctiva clear  Foot Exam (left):  Inspection: Left great toe MTP joint is warm, red, swollen.Palpation: Tender left MTP joint  Cap refill intact.    Good doralis pedis.  Neurovascularly Intact Distally.         Narcisa Bryant PA-C

## 2021-04-20 ENCOUNTER — IMMUNIZATION (OUTPATIENT)
Dept: PEDIATRICS | Facility: CLINIC | Age: 61
End: 2021-04-20
Payer: COMMERCIAL

## 2021-04-20 PROCEDURE — 91300 PR COVID VAC PFIZER DIL RECON 30 MCG/0.3 ML IM: CPT

## 2021-04-20 PROCEDURE — 0001A PR COVID VAC PFIZER DIL RECON 30 MCG/0.3 ML IM: CPT

## 2021-05-10 DIAGNOSIS — M10.9 GOUTY ARTHRITIS OF LEFT GREAT TOE: ICD-10-CM

## 2021-05-10 NOTE — TELEPHONE ENCOUNTER
"Requested Prescriptions   Pending Prescriptions Disp Refills     allopurinol (ZYLOPRIM) 300 MG tablet 30 tablet 1     Sig: Take 1 tablet (300 mg) by mouth daily .Do not start until flare up has resolved.       Gout Agents Protocol Failed - 5/10/2021  9:26 AM        Failed - Has Uric Acid on file in past 12 months and value is less than 6     Recent Labs   Lab Test 04/13/18  1710   URIC 8.1*     If level is 6mg/dL or greater, ok to refill one time and refer to provider.           Failed - Normal serum creatinine on file in the past 12 months     Recent Labs   Lab Test 01/27/21  1526   CR 1.70*       Ok to refill medication if creatinine is low          Passed - CBC on file in past 12 months     Recent Labs   Lab Test 01/27/21  1526   WBC 8.1   RBC 4.56   HGB 13.8   HCT 40.0                    Passed - ALT on file in past 12 months     Recent Labs   Lab Test 01/27/21  1526   ALT 69             Passed - Recent (12 mo) or future (30 days) visit within the authorizing provider's specialty     Patient has had an office visit with the authorizing provider or a provider within the authorizing providers department within the previous 12 mos or has a future within next 30 days. See \"Patient Info\" tab in inbasket, or \"Choose Columns\" in Meds & Orders section of the refill encounter.              Passed - Medication is active on med list        Passed - Patient is age 18 or older             "

## 2021-05-11 ENCOUNTER — IMMUNIZATION (OUTPATIENT)
Dept: PEDIATRICS | Facility: CLINIC | Age: 61
End: 2021-05-11
Attending: INTERNAL MEDICINE
Payer: COMMERCIAL

## 2021-05-11 PROCEDURE — 0002A PR COVID VAC PFIZER DIL RECON 30 MCG/0.3 ML IM: CPT

## 2021-05-11 PROCEDURE — 91300 PR COVID VAC PFIZER DIL RECON 30 MCG/0.3 ML IM: CPT

## 2021-05-11 RX ORDER — ALLOPURINOL 300 MG/1
300 TABLET ORAL DAILY
Qty: 30 TABLET | Refills: 1 | Status: SHIPPED | OUTPATIENT
Start: 2021-05-11 | End: 2021-05-12

## 2021-05-12 DIAGNOSIS — M10.9 GOUTY ARTHRITIS OF LEFT GREAT TOE: ICD-10-CM

## 2021-05-12 RX ORDER — ALLOPURINOL 300 MG/1
300 TABLET ORAL DAILY
Qty: 90 TABLET | Refills: 1 | Status: SHIPPED | OUTPATIENT
Start: 2021-05-12 | End: 2023-03-18

## 2021-05-12 NOTE — TELEPHONE ENCOUNTER
"Requested Prescriptions   Pending Prescriptions Disp Refills     allopurinol (ZYLOPRIM) 300 MG tablet [Pharmacy Med Name: ALLOPURINOL 300 MG TABLET] 90 tablet 1     Sig: TAKE 1 TABLET (300 MG) BY MOUTH DAILY .DO NOT START UNTIL FLARE UP HAS RESOLVED.       Gout Agents Protocol Failed - 5/12/2021 12:51 PM        Failed - Has Uric Acid on file in past 12 months and value is less than 6     Recent Labs   Lab Test 04/13/18  1710   URIC 8.1*     If level is 6mg/dL or greater, ok to refill one time and refer to provider.           Failed - Normal serum creatinine on file in the past 12 months     Recent Labs   Lab Test 01/27/21  1526   CR 1.70*     Ok to refill medication if creatinine is low          Passed - CBC on file in past 12 months     Recent Labs   Lab Test 01/27/21  1526   WBC 8.1   RBC 4.56   HGB 13.8   HCT 40.0                Passed - ALT on file in past 12 months     Recent Labs   Lab Test 01/27/21  1526   ALT 69             Passed - Recent (12 mo) or future (30 days) visit within the authorizing provider's specialty     Patient has had an office visit with the authorizing provider or a provider within the authorizing providers department within the previous 12 mos or has a future within next 30 days. See \"Patient Info\" tab in inbasket, or \"Choose Columns\" in Meds & Orders section of the refill encounter.            Passed - Medication is active on med list        Passed - Patient is age 18 or older           Jennifer UGARTE, RN    "

## 2021-05-23 DIAGNOSIS — A04.8 H. PYLORI INFECTION: ICD-10-CM

## 2021-05-23 PROCEDURE — 87338 HPYLORI STOOL AG IA: CPT | Performed by: FAMILY MEDICINE

## 2021-05-24 LAB — H PYLORI AG STL QL IA: NEGATIVE

## 2021-06-05 ENCOUNTER — TELEPHONE (OUTPATIENT)
Dept: URGENT CARE | Facility: URGENT CARE | Age: 61
End: 2021-06-05

## 2021-06-06 NOTE — TELEPHONE ENCOUNTER
Reason for call:  Medication   If this is a refill request, has the caller requested the refill from the pharmacy already? Yes  Will the patient be using a [facility]? No  Name of the pharmacy and phone number for the current request: [facility] - [address] [place] [address], [phone]    Name of the medication requested: colchicine (COLCYRS)    Other request: Refill as soon as possible and call Cydney back as soon as it is done    Phone number to reach patient:  Home number on file [phone] (home)    Best Time:  ANY/ASAP    Can we leave a detailed message on this number?  YES    Travel screening: Not Applicable

## 2021-06-06 NOTE — TELEPHONE ENCOUNTER
"I called and spoke to patient. Notified him of provider's message.     Patient isn't happy and states he didn't know why we can not give him refill but he had refill here before. Patient also states \"its fine\". Informed patient that [facility] doesn't do refill.    Joselo Flores CMA        "

## 2021-06-15 ENCOUNTER — TELEPHONE (OUTPATIENT)
Dept: FAMILY MEDICINE | Facility: CLINIC | Age: 61
End: 2021-06-15

## 2021-06-15 NOTE — TELEPHONE ENCOUNTER
Called Cydney to clarify his family wants to make sure everything okay  H.pylori went away (lab negative 3 weeks ago) but some foods cause symptoms- Heart burn after spicy foods, mild symptoms  Telephone appointment scheduled Friday 6/18/21 to discuss     Carolina Mcdonald RN, BSN, CMSRN  [facility]

## 2021-06-15 NOTE — TELEPHONE ENCOUNTER
"Wife calling back. Patient wants an MRI of his stomach to \"make sure everything is fine\".  [facility]  2nd Floor  Primary Care    "

## 2021-06-15 NOTE — TELEPHONE ENCOUNTER
Patient calling then was disconnected. Asking if Dr. Ruiz would order a MRI for him. Please call to discuss.  Karina Melvin

## 2021-06-18 ENCOUNTER — VIRTUAL VISIT (OUTPATIENT)
Dept: FAMILY MEDICINE | Facility: CLINIC | Age: 61
End: 2021-06-18
Payer: COMMERCIAL

## 2021-06-18 DIAGNOSIS — M10.9 GOUTY ARTHRITIS OF LEFT GREAT TOE: ICD-10-CM

## 2021-06-18 DIAGNOSIS — R10.13 ABDOMINAL PAIN, EPIGASTRIC: Primary | ICD-10-CM

## 2021-06-18 PROCEDURE — 99213 OFFICE O/P EST LOW 20 MIN: CPT | Mod: 95 | Performed by: FAMILY MEDICINE

## 2021-06-18 RX ORDER — PANTOPRAZOLE SODIUM 40 MG/1
40 TABLET, DELAYED RELEASE ORAL DAILY
Qty: 90 TABLET | Refills: 3 | Status: SHIPPED | OUTPATIENT
Start: 2021-06-18 | End: 2023-03-18

## 2021-06-18 RX ORDER — COLCHICINE 0.6 MG/1
TABLET ORAL
Qty: 15 TABLET | Refills: 3 | Status: SHIPPED | OUTPATIENT
Start: 2021-06-18 | End: 2021-09-10

## 2021-06-18 NOTE — PROGRESS NOTES
Cydney is a 61 year old who is being evaluated via a billable telephone visit.      What phone number would you like to be contacted at? mobile  How would you like to obtain your AVS? Hernestojoan Lamas is a 61 year old who presents for the following health issues:    HPI     Patient has h/o epigastric pain. Patient was treated for h pylori. Subsequent stool test showed eradication of this infection. Patient has been feeling well. He occasionally has pain with spicy food. Otherwise doing well. Patient was wondering if he can get a CT scan to make sure he does not have stomach cancer. He has a mother who had stomach cancer and worried about this. Patient eating and drinking without any difficulties. No n/v. No constant pain. Patient had negative EGD in 2018.    Patient wanting refills of colchicine to use as needed for gout.    Review of Systems   Constitutional, HEENT, cardiovascular, pulmonary, GI, , musculoskeletal, neuro, skin, endocrine and psych systems are negative, except as otherwise noted.      Objective           Vitals:  No vitals were obtained today due to virtual visit.    Physical Exam   healthy, alert and no distress  PSYCH: Alert and oriented times 3; coherent speech, normal   rate and volume, able to articulate logical thoughts, able   to abstract reason, no tangential thoughts, no hallucinations   or delusions  His affect is normal  RESP: No cough, no audible wheezing, able to talk in full sentences  Remainder of exam unable to be completed due to telephone visits    A/P:  (R10.13) Abdominal pain, epigastric  (primary encounter diagnosis)  Comment:   Plan: pantoprazole (PROTONIX) 40 MG EC tablet        Discussed no need for CT scan since EGD 2018 was negative and lack of symptoms. Patient understands and agrees with plan. Patient can take ppi daily as needed.    (M10.9) Gouty arthritis of left great toe  Comment:   Plan: colchicine (COLCYRS) 0.6 MG tablet        rx refilled.    RTC in 6  months for routine physical discussed.        Phone call duration: 11 minutes

## 2021-09-09 DIAGNOSIS — M10.9 GOUTY ARTHRITIS OF LEFT GREAT TOE: ICD-10-CM

## 2021-09-10 RX ORDER — COLCHICINE 0.6 MG/1
TABLET ORAL
Qty: 30 TABLET | Refills: 1 | Status: SHIPPED | OUTPATIENT
Start: 2021-09-10 | End: 2021-10-08

## 2021-10-08 DIAGNOSIS — M10.9 GOUTY ARTHRITIS OF LEFT GREAT TOE: ICD-10-CM

## 2021-10-08 RX ORDER — COLCHICINE 0.6 MG/1
TABLET ORAL
Qty: 30 TABLET | Refills: 1 | Status: SHIPPED | OUTPATIENT
Start: 2021-10-08 | End: 2021-11-04

## 2021-10-08 NOTE — TELEPHONE ENCOUNTER
"Requested Prescriptions   Pending Prescriptions Disp Refills     colchicine (COLCYRS) 0.6 MG tablet [Pharmacy Med Name: COLCHICINE 0.6 MG TABLET] 30 tablet 1     Sig: TAKE 2 TABLETS BY MOUTH DAY ONE, THEN 1 TAB 1 HOUR LATER AS NEED CAN TAKE 1 TAB EVERY 12 HRS IF NEED       Gout Agents Protocol Failed - 10/8/2021  9:30 AM        Failed - Has Uric Acid on file in past 12 months and value is less than 6     Recent Labs   Lab Test 04/13/18  1710   URIC 8.1*     If level is 6mg/dL or greater, ok to refill one time and refer to provider.           Failed - Normal serum creatinine on file in the past 12 months     Recent Labs   Lab Test 01/27/21  1526   CR 1.70*       Ok to refill medication if creatinine is low          Passed - CBC on file in past 12 months     Recent Labs   Lab Test 01/27/21  1526   WBC 8.1   RBC 4.56   HGB 13.8   HCT 40.0                    Passed - ALT on file in past 12 months     Recent Labs   Lab Test 01/27/21  1526   ALT 69             Passed - Recent (12 mo) or future (30 days) visit within the authorizing provider's specialty     Patient has had an office visit with the authorizing provider or a provider within the authorizing providers department within the previous 12 mos or has a future within next 30 days. See \"Patient Info\" tab in inbasket, or \"Choose Columns\" in Meds & Orders section of the refill encounter.              Passed - Medication is active on med list        Passed - Patient is age 18 or older           Jennifer UGARTE, RN    "

## 2021-11-03 DIAGNOSIS — M10.9 GOUTY ARTHRITIS OF LEFT GREAT TOE: ICD-10-CM

## 2021-11-04 RX ORDER — COLCHICINE 0.6 MG/1
TABLET ORAL
Qty: 30 TABLET | Refills: 1 | Status: SHIPPED | OUTPATIENT
Start: 2021-11-04 | End: 2021-12-07

## 2021-11-04 NOTE — TELEPHONE ENCOUNTER
"Requested Prescriptions   Pending Prescriptions Disp Refills     colchicine (COLCYRS) 0.6 MG tablet [Pharmacy Med Name: COLCHICINE 0.6 MG TABLET] 30 tablet 1     Sig: TAKE 2 TABLETS BY MOUTH DAY ONE, THEN 1 TAB 1 HOUR LATER AS NEED CAN TAKE 1 TAB EVERY 12 HRS IF NEED       Gout Agents Protocol Failed - 11/3/2021 12:32 PM        Failed - Has Uric Acid on file in past 12 months and value is less than 6     Recent Labs   Lab Test 04/13/18  1710   URIC 8.1*     If level is 6mg/dL or greater, ok to refill one time and refer to provider.           Failed - Normal serum creatinine on file in the past 12 months     Recent Labs   Lab Test 01/27/21  1526   CR 1.70*       Ok to refill medication if creatinine is low          Passed - CBC on file in past 12 months     Recent Labs   Lab Test 01/27/21  1526   WBC 8.1   RBC 4.56   HGB 13.8   HCT 40.0                    Passed - ALT on file in past 12 months     Recent Labs   Lab Test 01/27/21  1526   ALT 69             Passed - Recent (12 mo) or future (30 days) visit within the authorizing provider's specialty     Patient has had an office visit with the authorizing provider or a provider within the authorizing providers department within the previous 12 mos or has a future within next 30 days. See \"Patient Info\" tab in inbasket, or \"Choose Columns\" in Meds & Orders section of the refill encounter.              Passed - Medication is active on med list        Passed - Patient is age 18 or older             Jennifer UGARTE, RN    "

## 2021-12-06 DIAGNOSIS — M10.9 GOUTY ARTHRITIS OF LEFT GREAT TOE: ICD-10-CM

## 2021-12-07 RX ORDER — COLCHICINE 0.6 MG/1
TABLET ORAL
Qty: 30 TABLET | Refills: 1 | Status: SHIPPED | OUTPATIENT
Start: 2021-12-07 | End: 2021-12-21

## 2021-12-07 NOTE — TELEPHONE ENCOUNTER
"Requested Prescriptions   Pending Prescriptions Disp Refills    colchicine (COLCYRS) 0.6 MG tablet [Pharmacy Med Name: COLCHICINE 0.6 MG TABLET] 30 tablet 1     Sig: TAKE 2 TABLETS BY MOUTH DAY ONE, THEN 1 TAB 1 HOUR LATER AS NEED CAN TAKE 1 TAB EVERY 12 HRS IF NEED        Gout Agents Protocol Failed - 12/6/2021 12:32 PM        Failed - Has Uric Acid on file in past 12 months and value is less than 6     Recent Labs   Lab Test 04/13/18  1710   URIC 8.1*     If level is 6mg/dL or greater, ok to refill one time and refer to provider.             Failed - Normal serum creatinine on file in the past 12 months     Recent Labs   Lab Test 01/27/21  1526   CR 1.70*       Ok to refill medication if creatinine is low          Passed - CBC on file in past 12 months     Recent Labs   Lab Test 01/27/21  1526   WBC 8.1   RBC 4.56   HGB 13.8   HCT 40.0                      Passed - ALT on file in past 12 months     Recent Labs   Lab Test 01/27/21  1526   ALT 69               Passed - Recent (12 mo) or future (30 days) visit within the authorizing provider's specialty     Patient has had an office visit with the authorizing provider or a provider within the authorizing providers department within the previous 12 mos or has a future within next 30 days. See \"Patient Info\" tab in inbasket, or \"Choose Columns\" in Meds & Orders section of the refill encounter.              Passed - Medication is active on med list        Passed - Patient is age 18 or older              "

## 2021-12-16 ENCOUNTER — IMMUNIZATION (OUTPATIENT)
Dept: NURSING | Facility: CLINIC | Age: 61
End: 2021-12-16
Payer: COMMERCIAL

## 2021-12-16 PROCEDURE — 0004A PR COVID VAC PFIZER DIL RECON 30 MCG/0.3 ML IM: CPT

## 2021-12-16 PROCEDURE — 91300 PR COVID VAC PFIZER DIL RECON 30 MCG/0.3 ML IM: CPT

## 2021-12-17 DIAGNOSIS — M10.9 GOUTY ARTHRITIS OF LEFT GREAT TOE: ICD-10-CM

## 2021-12-20 NOTE — TELEPHONE ENCOUNTER
"Requested Prescriptions   Pending Prescriptions Disp Refills    colchicine (COLCYRS) 0.6 MG tablet [Pharmacy Med Name: COLCHICINE 0.6 MG TABLET] 180 tablet 1     Sig: TAKE 2 TABLETS BY MOUTH DAY ONE, THEN 1 TAB 1 HOUR LATER AS NEED CAN TAKE 1 TAB EVERY 12 HRS IF NEED        Gout Agents Protocol Failed - 12/17/2021 11:30 AM        Failed - Has Uric Acid on file in past 12 months and value is less than 6     Recent Labs   Lab Test 04/13/18  1710   URIC 8.1*     If level is 6mg/dL or greater, ok to refill one time and refer to provider.             Failed - Normal serum creatinine on file in the past 12 months     Recent Labs   Lab Test 01/27/21  1526   CR 1.70*       Ok to refill medication if creatinine is low          Passed - CBC on file in past 12 months     Recent Labs   Lab Test 01/27/21  1526   WBC 8.1   RBC 4.56   HGB 13.8   HCT 40.0                      Passed - ALT on file in past 12 months     Recent Labs   Lab Test 01/27/21  1526   ALT 69               Passed - Recent (12 mo) or future (30 days) visit within the authorizing provider's specialty     Patient has had an office visit with the authorizing provider or a provider within the authorizing providers department within the previous 12 mos or has a future within next 30 days. See \"Patient Info\" tab in inbasket, or \"Choose Columns\" in Meds & Orders section of the refill encounter.              Passed - Medication is active on med list        Passed - Patient is age 18 or older              "

## 2021-12-21 RX ORDER — COLCHICINE 0.6 MG/1
TABLET ORAL
Qty: 180 TABLET | Refills: 1 | Status: SHIPPED | OUTPATIENT
Start: 2021-12-21 | End: 2022-06-21

## 2022-06-19 DIAGNOSIS — M10.9 GOUTY ARTHRITIS OF LEFT GREAT TOE: ICD-10-CM

## 2022-06-20 NOTE — TELEPHONE ENCOUNTER
"Requested Prescriptions   Pending Prescriptions Disp Refills    colchicine (COLCYRS) 0.6 MG tablet [Pharmacy Med Name: COLCHICINE 0.6 MG TABLET] 180 tablet 1     Sig: TAKE 2 TABLETS BY MOUTH DAY ONE, THEN 1 TAB 1 HOUR LATER AS NEED CAN TAKE 1 TAB EVERY 12 HRS IF NEED        Gout Agents Protocol Failed - 6/19/2022  1:01 PM        Failed - CBC on file in past 12 months     Recent Labs   Lab Test 01/27/21  1526   WBC 8.1   RBC 4.56   HGB 13.8   HCT 40.0                      Failed - ALT on file in past 12 months     Recent Labs   Lab Test 01/27/21  1526   ALT 69               Failed - Has Uric Acid on file in past 12 months and value is less than 6     Recent Labs   Lab Test 04/13/18  1710   URIC 8.1*     If level is 6mg/dL or greater, ok to refill one time and refer to provider.             Failed - Recent (12 mo) or future (30 days) visit within the authorizing provider's specialty     Patient has had an office visit with the authorizing provider or a provider within the authorizing providers department within the previous 12 mos or has a future within next 30 days. See \"Patient Info\" tab in inbasket, or \"Choose Columns\" in Meds & Orders section of the refill encounter.              Failed - Normal serum creatinine on file in the past 12 months     Recent Labs   Lab Test 01/27/21  1526   CR 1.70*       Ok to refill medication if creatinine is low       Ariel Ace RN    "

## 2022-06-21 RX ORDER — COLCHICINE 0.6 MG/1
TABLET ORAL
Qty: 180 TABLET | Refills: 1 | Status: SHIPPED | OUTPATIENT
Start: 2022-06-21 | End: 2022-12-20

## 2022-07-20 ENCOUNTER — ALLIED HEALTH/NURSE VISIT (OUTPATIENT)
Dept: FAMILY MEDICINE | Facility: CLINIC | Age: 62
End: 2022-07-20
Payer: COMMERCIAL

## 2022-07-20 DIAGNOSIS — Z23 NEED FOR VACCINATION: Primary | ICD-10-CM

## 2022-07-20 PROCEDURE — 91305 COVID-19,PF,PFIZER (12+ YRS): CPT

## 2022-07-20 PROCEDURE — 90750 HZV VACC RECOMBINANT IM: CPT

## 2022-07-20 PROCEDURE — 99207 PR NO CHARGE NURSE ONLY: CPT

## 2022-07-20 PROCEDURE — 0054A COVID-19,PF,PFIZER (12+ YRS): CPT

## 2022-07-20 PROCEDURE — 90471 IMMUNIZATION ADMIN: CPT

## 2022-07-20 NOTE — PROGRESS NOTES
Prior to immunization administration, verified patients identity using patient s name and date of birth. Please see Immunization Activity for additional information.     Screening Questionnaire for Adult Immunization    Are you sick today?   No   Do you have allergies to medications, food, a vaccine component or latex?   No   Have you ever had a serious reaction after receiving a vaccination?   No   Do you have a long-term health problem with heart, lung, kidney, or metabolic disease (e.g., diabetes), asthma, a blood disorder, no spleen, complement component deficiency, a cochlear implant, or a spinal fluid leak?  Are you on long-term aspirin therapy?   No   Do you have cancer, leukemia, HIV/AIDS, or any other immune system problem?   No   Do you have a parent, brother, or sister with an immune system problem?   No   In the past 3 months, have you taken medications that affect  your immune system, such as prednisone, other steroids, or anticancer drugs; drugs for the treatment of rheumatoid arthritis, Crohn s disease, or psoriasis; or have you had radiation treatments?   No   Have you had a seizure, or a brain or other nervous system problem?   No   During the past year, have you received a transfusion of blood or blood    products, or been given immune (gamma) globulin or antiviral drug?   No   For women: Are you pregnant or is there a chance you could become       pregnant during the next month?   No   Have you received any vaccinations in the past 4 weeks?   No     Immunization questionnaire answers were all negative.        Per orders of Dr. Nagi Burgos, injection of Shingrix and Pfizer booster given by Dai Foote RN. Patient instructed to remain in clinic for 15 minutes afterwards, and to report any adverse reaction to me immediately.       Screening performed by Dai Foote RN on 7/20/2022 at 3:58 PM.

## 2022-12-19 DIAGNOSIS — M10.9 GOUTY ARTHRITIS OF LEFT GREAT TOE: ICD-10-CM

## 2022-12-20 RX ORDER — COLCHICINE 0.6 MG/1
TABLET ORAL
Qty: 180 TABLET | Refills: 1 | Status: SHIPPED | OUTPATIENT
Start: 2022-12-20

## 2023-03-17 ENCOUNTER — IMMUNIZATION (OUTPATIENT)
Dept: NURSING | Facility: CLINIC | Age: 63
End: 2023-03-17
Payer: COMMERCIAL

## 2023-03-17 PROCEDURE — 0124A COVID-19 VACCINE BIVALENT BOOSTER 12+ (PFIZER): CPT

## 2023-03-17 PROCEDURE — 91312 COVID-19 VACCINE BIVALENT BOOSTER 12+ (PFIZER): CPT

## 2023-03-18 ENCOUNTER — OFFICE VISIT (OUTPATIENT)
Dept: URGENT CARE | Facility: URGENT CARE | Age: 63
End: 2023-03-18
Payer: COMMERCIAL

## 2023-03-18 VITALS
WEIGHT: 238.3 LBS | BODY MASS INDEX: 38.48 KG/M2 | DIASTOLIC BLOOD PRESSURE: 96 MMHG | SYSTOLIC BLOOD PRESSURE: 152 MMHG | HEART RATE: 80 BPM | TEMPERATURE: 97 F | OXYGEN SATURATION: 98 %

## 2023-03-18 DIAGNOSIS — M10.9 ACUTE GOUT OF LEFT ANKLE, UNSPECIFIED CAUSE: Primary | ICD-10-CM

## 2023-03-18 DIAGNOSIS — I10 ESSENTIAL HYPERTENSION WITH GOAL BLOOD PRESSURE LESS THAN 140/90: ICD-10-CM

## 2023-03-18 PROCEDURE — 99213 OFFICE O/P EST LOW 20 MIN: CPT | Performed by: PHYSICIAN ASSISTANT

## 2023-03-18 RX ORDER — PREDNISONE 20 MG/1
40 TABLET ORAL DAILY
Qty: 10 TABLET | Refills: 0 | Status: SHIPPED | OUTPATIENT
Start: 2023-03-18 | End: 2023-03-23

## 2023-03-18 ASSESSMENT — ENCOUNTER SYMPTOMS
COUGH: 0
CONSTITUTIONAL NEGATIVE: 1
CHILLS: 0
WHEEZING: 0
MYALGIAS: 0
FREQUENCY: 0
SORE THROAT: 0
NAUSEA: 0
ABDOMINAL PAIN: 0
HEMATURIA: 0
DYSURIA: 0
SHORTNESS OF BREATH: 0
GASTROINTESTINAL NEGATIVE: 1
DIARRHEA: 0
CHEST TIGHTNESS: 0
NEUROLOGICAL NEGATIVE: 1
ALLERGIC/IMMUNOLOGIC NEGATIVE: 1
CARDIOVASCULAR NEGATIVE: 1
RESPIRATORY NEGATIVE: 1
ARTHRALGIAS: 1
PALPITATIONS: 0
VOMITING: 0
HEADACHES: 0
FEVER: 0

## 2023-03-18 NOTE — PROGRESS NOTES
Chief Complaint:     Chief Complaint   Patient presents with     Arthritis     Left ankle      ASSESSMENT     1. Acute gout of left ankle, unspecified cause    2. Essential hypertension with goal blood pressure less than 140/90         PLAN    Rx for Prednisone sent in.  Continue Colchicine.  RICE discussed  Recommended rest and avoidance of activities which cause pain or swelling.  Patient is hypertensive in clinic today.  Second BP reading was also above goal at 152/96.  Patient is currently taking herbal supplements for HTN.  Patient instructed to follow up with PCP in the next week for BP recheck.  Sooner if symptoms worsen.    Patient verbalized understanding, and agrees with this plan.       HPI: Cydney Flores is an 62 year old male who presents today for evaluation of L ankle pain.  Patient has a Hx of gout and states that his Colchicine is not working as well and would like to try Prednisone.  No recent injury.    Patient denies any numbness, tingling, or dysfunction of the L leg.      ROS:      Review of Systems   Constitutional: Negative.  Negative for chills and fever.   HENT: Negative.  Negative for sore throat.    Respiratory: Negative.  Negative for cough, chest tightness, shortness of breath and wheezing.    Cardiovascular: Negative.  Negative for chest pain and palpitations.   Gastrointestinal: Negative.  Negative for abdominal pain, diarrhea, nausea and vomiting.   Genitourinary: Negative for dysuria, frequency, hematuria and urgency.   Musculoskeletal: Positive for arthralgias. Negative for myalgias.   Skin: Negative for rash.   Allergic/Immunologic: Negative.  Negative for immunocompromised state.   Neurological: Negative.  Negative for headaches.        Problem history  Patient Active Problem List   Diagnosis     Idiopathic chronic gout of multiple sites without tophus     CARDIOVASCULAR SCREENING; LDL GOAL LESS THAN 130     Essential hypertension with goal blood pressure less than 140/90     Severe  obesity (BMI 35.0-39.9) with comorbidity (H)        Allergies  No Known Allergies     Smoking History  History   Smoking Status     Never   Smokeless Tobacco     Never        Current Meds    Current Outpatient Medications:      colchicine (COLCYRS) 0.6 MG tablet, TAKE 2 TABLETS BY MOUTH DAY ONE, THEN 1 TAB 1 HOUR LATER AS NEED CAN TAKE 1 TAB EVERY 12 HRS IF NEED, Disp: 180 tablet, Rfl: 1     predniSONE (DELTASONE) 20 MG tablet, Take 2 tablets (40 mg) by mouth daily for 5 days, Disp: 10 tablet, Rfl: 0        Vital signs reviewed by Yoni Bearden PA-C  BP (!) 152/96   Pulse 80   Temp 97  F (36.1  C) (Tympanic)   Wt 108.1 kg (238 lb 4.8 oz)   SpO2 98%   BMI 38.48 kg/m      Physical Exam     Physical Exam  Vitals and nursing note reviewed.   Constitutional:       General: He is not in acute distress.     Appearance: He is well-developed. He is not ill-appearing, toxic-appearing or diaphoretic.   HENT:      Head: Normocephalic and atraumatic.      Right Ear: Hearing, tympanic membrane, ear canal and external ear normal. Tympanic membrane is not perforated, erythematous, retracted or bulging.      Left Ear: Hearing, tympanic membrane, ear canal and external ear normal. Tympanic membrane is not perforated, erythematous, retracted or bulging.      Nose: Nose normal. No mucosal edema, congestion or rhinorrhea.      Mouth/Throat:      Pharynx: No oropharyngeal exudate or posterior oropharyngeal erythema.      Tonsils: No tonsillar exudate or tonsillar abscesses. 0 on the right. 0 on the left.   Eyes:      Pupils: Pupils are equal, round, and reactive to light.   Cardiovascular:      Rate and Rhythm: Normal rate and regular rhythm.      Heart sounds: Normal heart sounds, S1 normal and S2 normal. Heart sounds not distant. No murmur heard.    No friction rub. No gallop.   Pulmonary:      Effort: Pulmonary effort is normal. No respiratory distress.      Breath sounds: Normal breath sounds. No decreased breath sounds,  wheezing, rhonchi or rales.   Abdominal:      General: Bowel sounds are normal. There is no distension.      Palpations: Abdomen is soft.      Tenderness: There is no abdominal tenderness.   Musculoskeletal:      Cervical back: Normal range of motion and neck supple.      Left ankle: Swelling present. No deformity. No tenderness. Decreased range of motion. Anterior drawer test negative. Normal pulse.   Lymphadenopathy:      Cervical: No cervical adenopathy.   Skin:     General: Skin is warm and dry.      Findings: No rash.   Neurological:      Mental Status: He is alert.      Cranial Nerves: No cranial nerve deficit.   Psychiatric:         Attention and Perception: He is attentive.         Speech: Speech normal.         Behavior: Behavior normal. Behavior is cooperative.         Thought Content: Thought content normal.         Judgment: Judgment normal.             Yoni Bearden PA-C  3/18/2023, 9:13 AM

## 2023-04-23 ENCOUNTER — NURSE TRIAGE (OUTPATIENT)
Dept: NURSING | Facility: CLINIC | Age: 63
End: 2023-04-23
Payer: COMMERCIAL

## 2023-04-23 NOTE — TELEPHONE ENCOUNTER
"Patient asking for refill of prednisone.  Per message from  provider on 3/27/23:  \"Patient needs appt with PCP for refills.\"    I relayed this message to Cydney.  He said he doesn't have a pcp.  I advised getting a pcp and explained reasons why.  I told him he may be able to get this if he goes to an . I told him I can't promise though that this will happen.  Caller stated understanding.  He's planning to go to [facility] [place].    Simin GANN RN [facility]     "

## 2023-12-16 ENCOUNTER — OFFICE VISIT (OUTPATIENT)
Dept: URGENT CARE | Facility: URGENT CARE | Age: 63
End: 2023-12-16
Payer: COMMERCIAL

## 2023-12-16 VITALS
WEIGHT: 242.2 LBS | OXYGEN SATURATION: 94 % | HEART RATE: 98 BPM | SYSTOLIC BLOOD PRESSURE: 130 MMHG | TEMPERATURE: 97 F | BODY MASS INDEX: 39.11 KG/M2 | RESPIRATION RATE: 24 BRPM | DIASTOLIC BLOOD PRESSURE: 95 MMHG

## 2023-12-16 DIAGNOSIS — I10 ELEVATED BLOOD PRESSURE READING IN OFFICE WITH DIAGNOSIS OF HYPERTENSION: ICD-10-CM

## 2023-12-16 DIAGNOSIS — M10.9 ACUTE GOUT OF LEFT ANKLE, UNSPECIFIED CAUSE: Primary | ICD-10-CM

## 2023-12-16 PROCEDURE — 99214 OFFICE O/P EST MOD 30 MIN: CPT | Performed by: PHYSICIAN ASSISTANT

## 2023-12-16 RX ORDER — PREDNISONE 20 MG/1
40 TABLET ORAL DAILY
Qty: 10 TABLET | Refills: 0 | Status: SHIPPED | OUTPATIENT
Start: 2023-12-16 | End: 2023-12-21

## 2023-12-16 NOTE — PROGRESS NOTES
Chief Complaint   Patient presents with    Arthritis     Gout flare up; needs refill on medication                   ASSESSMENT:     ICD-10-CM    1. Acute gout of left ankle, unspecified cause  M10.9 predniSONE (DELTASONE) 20 MG tablet      2. Elevated blood pressure reading in office with diagnosis of hypertension  I10 predniSONE (DELTASONE) 20 MG tablet            PLAN: Prednisone x 5 days for gout.    Discussed my concern with his blood pressure value.  Told him untreated hypertension can cause kidney failure and is the #1 cause of stroke.  Strongly recommend that he schedule follow-up appointment with his primary Dr. Ruiz to further discuss.  Would be a good idea to schedule for an annual physical.  I offered to send him to the  to set up an appointment but he states he wants to call.  I have discussed clinical findings with patient.  Side effects of medications discussed.  Symptomatic care is discussed.  I have discussed the possibility of  worsening symptoms and indication to RTC or go to the ER if they occur.  All questions are answered, patient indicates understanding of these issues and is in agreement with plan.   Patient care instructions are discussed/given at the end of visit.       Narcisa Bryant PA-C      SUBJECTIVE:  63-year-old male presents for left ankle gout flare over the past several days.  Tried Colcrys without significant relief.  Requesting prednisone.  No fever   Blood pressure extremely elevated here today.  He denies headache or dizziness.  Review of flowsheets shows many elevated blood pressure values.  He states Dr. Ruiz did talk to him about blood pressure medication but he declined.      No Known Allergies    No past medical history on file.    colchicine (COLCYRS) 0.6 MG tablet, TAKE 2 TABLETS BY MOUTH DAY ONE, THEN 1 TAB 1 HOUR LATER AS NEED CAN TAKE 1 TAB EVERY 12 HRS IF NEED    No current facility-administered medications on file prior to visit.      Social History      Tobacco Use    Smoking status: Never    Smokeless tobacco: Never   Substance Use Topics    Alcohol use: Yes     Comment: occasional, social       ROS:  CONSTITUTIONAL: Negative for fatigue or fever.  EYES: Negative for eye problems.  ENT: As above.  RESP: As above.  CV: Negative for chest pains.  GI: Negative for vomiting.  MUSCULOSKELETAL:  Negative for significant muscle or joint pains.  NEUROLOGIC: Negative for headaches.  SKIN: Negative for rash.  PSYCH: Normal mentation for age.    OBJECTIVE:  BP (!) 130/95   Pulse 98   Temp 97  F (36.1  C) (Tympanic)   Resp 24   Wt 109.9 kg (242 lb 3.2 oz)   SpO2 94%   BMI 39.11 kg/m    GENERAL APPEARANCE: Healthy, alert and no distress.  EYES:Conjunctiva/sclera clear.  RESP: Lungs clear to auscultation - no rales, rhonchi or wheezes  CV: Regular rate and rhythm, normal S1 S2, no murmur noted.  NEURO: Awake, alert    SKIN: Left lateral ankle with mild erythema and swelling.  Tenderness.    Good palpable radial pulse.          Narcisa Bryant PA-C

## 2024-01-28 ENCOUNTER — OFFICE VISIT (OUTPATIENT)
Dept: URGENT CARE | Facility: URGENT CARE | Age: 64
End: 2024-01-28
Payer: COMMERCIAL

## 2024-01-28 VITALS
SYSTOLIC BLOOD PRESSURE: 144 MMHG | OXYGEN SATURATION: 96 % | HEART RATE: 117 BPM | BODY MASS INDEX: 38.64 KG/M2 | RESPIRATION RATE: 24 BRPM | WEIGHT: 239.31 LBS | DIASTOLIC BLOOD PRESSURE: 104 MMHG | TEMPERATURE: 98.9 F

## 2024-01-28 DIAGNOSIS — M10.372 ACUTE GOUT DUE TO RENAL IMPAIRMENT INVOLVING LEFT ANKLE: Primary | ICD-10-CM

## 2024-01-28 PROCEDURE — 99213 OFFICE O/P EST LOW 20 MIN: CPT | Performed by: PHYSICIAN ASSISTANT

## 2024-01-28 RX ORDER — PANTOPRAZOLE SODIUM 40 MG/1
TABLET, DELAYED RELEASE ORAL
COMMUNITY
Start: 2023-07-11 | End: 2024-01-28

## 2024-01-28 RX ORDER — PREDNISONE 20 MG/1
40 TABLET ORAL DAILY
Qty: 10 TABLET | Refills: 0 | Status: SHIPPED | OUTPATIENT
Start: 2024-01-28 | End: 2024-02-02

## 2024-01-28 RX ORDER — ONDANSETRON 4 MG/1
TABLET, ORALLY DISINTEGRATING ORAL
COMMUNITY
Start: 2023-07-10 | End: 2024-01-28

## 2024-01-28 ASSESSMENT — ENCOUNTER SYMPTOMS
NECK STIFFNESS: 0
COLOR CHANGE: 0
CARDIOVASCULAR NEGATIVE: 1
FEVER: 0
BACK PAIN: 0
NECK PAIN: 0
FATIGUE: 0
MYALGIAS: 1
JOINT SWELLING: 1
WOUND: 0
ARTHRALGIAS: 1
PALPITATIONS: 0
CHILLS: 0

## 2024-01-28 NOTE — PROGRESS NOTES
Neto Lamas is a 63 year old, presenting for the following health issues:  Urgent Care (Urgent care visit for left ankle pain. He came here 1-2 months ago with the same issues and got steroids. That is the only think that works per the patient's report.), Ankle Pain (Left ankle pain since yesterday morning. His ankle is swollen. Last night it was very painful. He has a history of gout. ), and Arthritis    HPI   Musculoskeletal problem/pain  Onset/Duration: 2days  Description  Location: L ankle  Joint Swelling: Yes  Redness: no  Pain: YES  Warmth: no  Intensity:  moderate  Progression of Symptoms:  same  Accompanying signs and symptoms:   Fevers: no  Numbness/tingling/weakness: no  History  Trauma to the area: no   Recent illness:  no  Previous similar problem: Yes, reports hx of gout and this feels similar to that.  Does eat some red meat but no seafood or ETOH.  Previous evaluation:  no  Precipitating or alleviating factors:  Aggravating factors include: standing, walking, overuse  Therapies tried and outcome: rest/inactivity, immobilization, acetaminophen, with minimal relief    Patient Active Problem List   Diagnosis    Idiopathic chronic gout of multiple sites without tophus    CARDIOVASCULAR SCREENING; LDL GOAL LESS THAN 130    Essential hypertension with goal blood pressure less than 140/90    Severe obesity (BMI 35.0-39.9) with comorbidity (H)     Current Outpatient Medications   Medication    colchicine (COLCYRS) 0.6 MG tablet     No current facility-administered medications for this visit.      No Known Allergies    Review of Systems   Constitutional:  Negative for chills, fatigue and fever.   Cardiovascular: Negative.  Negative for chest pain, palpitations and leg swelling.   Musculoskeletal:  Positive for arthralgias, gait problem, joint swelling and myalgias. Negative for back pain, neck pain and neck stiffness.   Skin: Negative.  Negative for color change, pallor, rash and wound.   All other  systems reviewed and are negative.          Objective    BP (!) 161/101 (BP Location: Left arm, Patient Position: Sitting, Cuff Size: Adult Large)   Pulse 117   Temp 98.9  F (37.2  C) (Oral)   Resp 24   Wt 108.6 kg (239 lb 5 oz)   SpO2 96%   BMI 38.64 kg/m    Body mass index is 38.64 kg/m .  Physical Exam  Vitals and nursing note reviewed.   Constitutional:       General: He is not in acute distress.     Appearance: Normal appearance. He is well-developed. He is obese. He is not ill-appearing.   Musculoskeletal:      Right ankle: Normal. No swelling, ecchymosis or lacerations. No tenderness. Normal range of motion.      Right Achilles Tendon: Normal.      Left ankle: Swelling present. No deformity or ecchymosis. Tenderness present over the lateral malleolus and medial malleolus. Decreased range of motion. Anterior drawer test negative. Normal pulse.      Left Achilles Tendon: Normal.      Right foot: Normal range of motion and normal capillary refill. No swelling, deformity, laceration, tenderness, bony tenderness or crepitus. Normal pulse.      Left foot: Normal. Normal range of motion and normal capillary refill. No swelling, deformity, laceration, tenderness, bony tenderness or crepitus. Normal pulse.   Skin:     General: Skin is warm.      Comments: Distal pulses are 2+ and symmetric.  No peripheral edema.   Neurological:      Mental Status: He is alert and oriented to person, place, and time.      Sensory: No sensory deficit.      Gait: Gait abnormal.      Deep Tendon Reflexes: Reflexes are normal and symmetric.   Psychiatric:         Mood and Affect: Mood normal.         Behavior: Behavior normal.         Thought Content: Thought content normal.         Judgment: Judgment normal.              Assessment/Plan:  Acute gout due to renal impairment involving left ankle:  No trauma or injuries.  Recommend RICE, gout diet and will give [id number].  Colchicine does not work for him.  Will send to  orthopedics if no improvement.  Recheck in clinic with PCP if symptoms worsen or if symptoms do not improve.   -     predniSONE (DELTASONE) 20 MG tablet; Take 2 tablets (40 mg) by mouth daily for 5 days        Francisca See FAYE Mittal

## 2024-08-18 ENCOUNTER — OFFICE VISIT (OUTPATIENT)
Dept: URGENT CARE | Facility: URGENT CARE | Age: 64
End: 2024-08-18
Payer: COMMERCIAL

## 2024-08-18 VITALS
TEMPERATURE: 99.5 F | OXYGEN SATURATION: 99 % | HEART RATE: 110 BPM | WEIGHT: 244.7 LBS | SYSTOLIC BLOOD PRESSURE: 150 MMHG | RESPIRATION RATE: 16 BRPM | DIASTOLIC BLOOD PRESSURE: 100 MMHG | BODY MASS INDEX: 39.51 KG/M2

## 2024-08-18 DIAGNOSIS — N18.31 STAGE 3A CHRONIC KIDNEY DISEASE (H): ICD-10-CM

## 2024-08-18 DIAGNOSIS — M10.9 ACUTE GOUT OF RIGHT KNEE, UNSPECIFIED CAUSE: ICD-10-CM

## 2024-08-18 DIAGNOSIS — I10 ELEVATED BLOOD PRESSURE READING IN OFFICE WITH DIAGNOSIS OF HYPERTENSION: ICD-10-CM

## 2024-08-18 DIAGNOSIS — M10.9 GOUTY ARTHRITIS OF RIGHT GREAT TOE: Primary | ICD-10-CM

## 2024-08-18 PROCEDURE — 99214 OFFICE O/P EST MOD 30 MIN: CPT | Performed by: PHYSICIAN ASSISTANT

## 2024-08-18 RX ORDER — PREDNISONE 10 MG/1
TABLET ORAL
Qty: 30 TABLET | Refills: 0 | Status: SHIPPED | OUTPATIENT
Start: 2024-08-18

## 2024-08-18 NOTE — PROGRESS NOTES
"   Chief Complaint   Patient presents with    Urgent Care    Arthritis     Gout of right knee and the \"gap\" between the big toe and 2nd toe of right foot            ASSESSMENT:    ICD-10-CM    1. Gouty arthritis of right great toe  M10.9 predniSONE (DELTASONE) 10 MG tablet      2. Acute gout of right knee, unspecified cause  M10.9 predniSONE (DELTASONE) 10 MG tablet      3. Stage 3a chronic kidney disease (H)  N18.31 predniSONE (DELTASONE) 10 MG tablet      4. Elevated blood pressure reading in office with diagnosis of hypertension  I10 predniSONE (DELTASONE) 10 MG tablet            PLAN: Really  worried about his blood pressure.  Told him elevated blood pressure is a #1 cause of stroke.  Offered to start him on blood pressure medication but he declines.  He wants to see a primary care provider.  Right great toe gout.  Unclear if right knee has gout or if it is sore because he has been walking differently with the toe gout flare.  He has had gout in his knee before and states this feels similar.  Prednisone taper.  Monitor blood pressure at home and write down values for primary care provider.  Offered to get blood work to check kidney function prior to follow-up with a primary but he declines.  He needs to establish care with primary care provider within 3 weeks.  Stressed the importance of this.      Ankle splint provided today.  OTC pain control as needed.  Ice, elevate, lowly increase activity level with active range of motion exercises encouraged.    Narcisa Bryant PA-C        SUBJECTIVE:  Cydney Flores is an 64 year old male who presents with gout flareup of right great toe  And second toe.  Onset a few days.  Had alcohol at a wedding and he is fairly certain this is what flared it up.  Last flareup of gout was 3/23.  Now he thinks his right knee also has gout.  It is painful.  Has been using crutches.  No numbness or tingling.  No back pain.    Elevated blood pressure here today.  Several elevated blood " pressures over multiple visits.  He denies chest pain, shortness of breath, headache, dizziness, decreased vision.    History of chronic kidney disease    Has not seen Dr. Ruiz since June 2021.    No past medical history on file.  History   Smoking Status    Never   Smokeless Tobacco    Never       ROS:  GEN no fevers  SKIN no erythema  Musculoskeletal:  See HPI.      OBJECTIVE:  Blood pressure (!) 150/100, pulse 110, temperature 99.5  F (37.5  C), temperature source Tympanic, resp. rate 16, weight 111 kg (244 lb 11.2 oz), SpO2 99%.  Patient is alert and NAD.  EYES: conjunctiva clear  Ankle/foot   Exam (right):  Inspection/palpation: Right great toe with some redness and swelling.  Tender on the bottom of the foot at the great toe MTP joint and slightly over the second MTP P joint..  Full range of motion of great toe but with pain.  Right knee with mild medial swelling and mild medial joint tenderness.  No skin redness.  Mild decreased range of motion secondary to pain.  No gross joint laxity noted.  Cap refill intact.    Good doralis pedis.  Neurovascularly Intact Distally.   No calf pain or swelling.  Negative Homans.  Heart-regular rate and rhythm without murmur.  Heart rate 110 to auscultation  Lungs are clear to auscultation  Smile symmetric  Negative pronator drift  No slurred speech.    Narcisa Bryant PA-C

## 2025-02-11 ENCOUNTER — OFFICE VISIT (OUTPATIENT)
Dept: FAMILY MEDICINE | Facility: CLINIC | Age: 65
End: 2025-02-11
Payer: COMMERCIAL

## 2025-02-11 VITALS
BODY MASS INDEX: 35.03 KG/M2 | RESPIRATION RATE: 18 BRPM | DIASTOLIC BLOOD PRESSURE: 86 MMHG | TEMPERATURE: 98 F | SYSTOLIC BLOOD PRESSURE: 127 MMHG | WEIGHT: 218 LBS | HEART RATE: 85 BPM | OXYGEN SATURATION: 98 % | HEIGHT: 66 IN

## 2025-02-11 DIAGNOSIS — I10 ESSENTIAL HYPERTENSION WITH GOAL BLOOD PRESSURE LESS THAN 140/90: Primary | ICD-10-CM

## 2025-02-11 DIAGNOSIS — E11.9 TYPE 2 DIABETES MELLITUS WITHOUT COMPLICATION, WITHOUT LONG-TERM CURRENT USE OF INSULIN (H): ICD-10-CM

## 2025-02-11 DIAGNOSIS — Z11.4 SCREENING FOR HIV (HUMAN IMMUNODEFICIENCY VIRUS): ICD-10-CM

## 2025-02-11 LAB
EST. AVERAGE GLUCOSE BLD GHB EST-MCNC: >384 MG/DL
HBA1C MFR BLD: >15 % (ref 0–5.6)

## 2025-02-11 PROCEDURE — 83721 ASSAY OF BLOOD LIPOPROTEIN: CPT | Mod: 59 | Performed by: PHYSICIAN ASSISTANT

## 2025-02-11 PROCEDURE — G2211 COMPLEX E/M VISIT ADD ON: HCPCS | Performed by: PHYSICIAN ASSISTANT

## 2025-02-11 PROCEDURE — 87389 HIV-1 AG W/HIV-1&-2 AB AG IA: CPT | Performed by: PHYSICIAN ASSISTANT

## 2025-02-11 PROCEDURE — 90472 IMMUNIZATION ADMIN EACH ADD: CPT | Performed by: PHYSICIAN ASSISTANT

## 2025-02-11 PROCEDURE — 99214 OFFICE O/P EST MOD 30 MIN: CPT | Mod: 25 | Performed by: PHYSICIAN ASSISTANT

## 2025-02-11 PROCEDURE — 83036 HEMOGLOBIN GLYCOSYLATED A1C: CPT | Performed by: PHYSICIAN ASSISTANT

## 2025-02-11 PROCEDURE — 36415 COLL VENOUS BLD VENIPUNCTURE: CPT | Performed by: PHYSICIAN ASSISTANT

## 2025-02-11 PROCEDURE — 90471 IMMUNIZATION ADMIN: CPT | Performed by: PHYSICIAN ASSISTANT

## 2025-02-11 PROCEDURE — 80061 LIPID PANEL: CPT | Performed by: PHYSICIAN ASSISTANT

## 2025-02-11 PROCEDURE — 90750 HZV VACC RECOMBINANT IM: CPT | Performed by: PHYSICIAN ASSISTANT

## 2025-02-11 PROCEDURE — 90715 TDAP VACCINE 7 YRS/> IM: CPT | Performed by: PHYSICIAN ASSISTANT

## 2025-02-11 RX ORDER — LANCETS
EACH MISCELLANEOUS
Qty: 100 EACH | Refills: 6 | Status: SHIPPED | OUTPATIENT
Start: 2025-02-11

## 2025-02-11 NOTE — PROGRESS NOTES
"  Assessment & Plan     Essential hypertension with goal blood pressure less than 140/90  At goal.      Screening for HIV (human immunodeficiency virus)  Follow up as needed  - HIV Antigen Antibody Combo; Future  - HIV Antigen Antibody Combo    Type 2 diabetes mellitus without complication, without long-term current use of insulin (H)  Follow up when labs are back   - Lipid panel reflex to direct LDL Non-fasting; Future  - blood glucose monitoring (NO BRAND SPECIFIED) meter device kit; Use to test blood sugar 1 times daily or as directed. Preferred blood glucose meter OR supplies to accompany: Blood Glucose Monitor Brands: per insurance.  - blood glucose (NO BRAND SPECIFIED) test strip; Use to test blood sugar 1 times daily or as directed. To accompany: Blood Glucose Monitor Brands: per insurance.  - thin (NO BRAND SPECIFIED) lancets; Use with lanceting device. To accompany: Blood Glucose Monitor Brands: per insurance.  - Amb Adult Diabetes Educator Referral - Routine; Future  - Hemoglobin A1c; Future  - Lipid panel reflex to direct LDL Non-fasting  - Hemoglobin A1c    Follow up in a month     MED REC REQUIRED  Post Medication Reconciliation Status:  Discharge medications reconciled, continue medications without change  BMI  Estimated body mass index is 35.45 kg/m  as calculated from the following:    Height as of this encounter: 1.67 m (5' 5.75\").    Weight as of this encounter: 98.9 kg (218 lb).   Weight management plan: not addressed           Subjective   Cydney is a 64 year old, presenting for the following health issues:  Physical      2/11/2025     3:59 PM   Additional Questions   Roomed by Alice   Accompanied by wife         2/11/2025     3:59 PM   Patient Reported Additional Medications   Patient reports taking the following new medications Metformin 500 mg 2 tablets daily     Via the Health Maintenance questionnaire, the patient has reported the following services have been completed -Colonscopy: [facility] " "[place] 2016-10-26, this information has not been sent to the abstraction team.  [facility]       ED/UC Followup:    Facility:  [facility]   Date of visit: 2-10-25   Reason for visit: blurry vision,thirst   Current Status: feeling better     Feeling ok on metformin.  Was dx yesterday in ER with diabetes.      Gout in multiple joints-all lower -mostly ankle.    Checked sugar this am was 270.              Objective    /86   Pulse 85   Temp 98  F (36.7  C) (Temporal)   Resp 18   Ht 1.67 m (5' 5.75\")   Wt 98.9 kg (218 lb)   SpO2 98%   BMI 35.45 kg/m    Body mass index is 35.45 kg/m .  Physical Exam  Constitutional:       General: He is not in acute distress.     Appearance: He is obese.   Cardiovascular:      Rate and Rhythm: Normal rate and regular rhythm.   Pulmonary:      Effort: Pulmonary effort is normal.      Breath sounds: Normal breath sounds.   Neurological:      Mental Status: He is alert.   Psychiatric:         Mood and Affect: Mood normal.                    Signed Electronically by: Sheri Anand PA-C    "

## 2025-02-12 ENCOUNTER — NURSE TRIAGE (OUTPATIENT)
Dept: NURSING | Facility: CLINIC | Age: 65
End: 2025-02-12
Payer: COMMERCIAL

## 2025-02-12 LAB — HIV 1+2 AB+HIV1 P24 AG SERPL QL IA: NONREACTIVE

## 2025-02-13 LAB
CHOLEST SERPL-MCNC: 213 MG/DL
FASTING STATUS PATIENT QL REPORTED: YES
HDLC SERPL-MCNC: 28 MG/DL
LDLC SERPL CALC-MCNC: ABNORMAL MG/DL
LDLC SERPL DIRECT ASSAY-MCNC: 109 MG/DL
NONHDLC SERPL-MCNC: 185 MG/DL
TRIGL SERPL-MCNC: 453 MG/DL

## 2025-02-13 NOTE — TELEPHONE ENCOUNTER
Cydney gave consent to communicate with daughter.  Monday ER patient was and blood sugar was high.  Diagnosed with new diabetes on Monday.  Tonight ate dinner and waited two hours and one machine over 500 and other machine states mid 400's.   ER prescribed metformin 500 mg tablet and to take 2 times daily.  No insulin was prescribed.  Patient's only symptom is cold and upset stomach.  FNA gave disposition and Amita states that they will go to ER.        Reason for Disposition   Blood glucose > 500 mg/dL (27.8 mmol/L)    Additional Information   Negative: Unconscious or difficult to awaken   Negative: Acting confused (e.g., disoriented, slurred speech)   Negative: Very weak (e.g., can't stand)   Negative: Sounds like a life-threatening emergency to the triager   Negative: [1] Vomiting AND [2] signs of dehydration (e.g., very dry mouth, lightheaded, dark urine)   Negative: [1] Blood glucose > 240 mg/dL (13.3 mmol/L) AND [2] rapid breathing    Protocols used: Diabetes - High Blood Sugar-A-

## 2025-02-28 ENCOUNTER — TELEPHONE (OUTPATIENT)
Dept: CARDIOLOGY | Facility: CLINIC | Age: 65
End: 2025-02-28

## 2025-02-28 DIAGNOSIS — N18.30 CKD STAGE 3 DUE TO TYPE 2 DIABETES MELLITUS (H): ICD-10-CM

## 2025-02-28 DIAGNOSIS — E11.22 CKD STAGE 3 DUE TO TYPE 2 DIABETES MELLITUS (H): ICD-10-CM

## 2025-02-28 PROBLEM — M79.662 PAIN OF LEFT LOWER LEG: Status: ACTIVE | Noted: 2025-02-28

## 2025-02-28 PROBLEM — I21.3 ST ELEVATION MYOCARDIAL INFARCTION (STEMI), UNSPECIFIED ARTERY (H): Status: ACTIVE | Noted: 2025-02-28

## 2025-02-28 PROBLEM — E66.01 CLASS 2 SEVERE OBESITY DUE TO EXCESS CALORIES WITH SERIOUS COMORBIDITY AND BODY MASS INDEX (BMI) OF 35.0 TO 35.9 IN ADULT (H): Status: ACTIVE | Noted: 2018-04-13

## 2025-02-28 PROBLEM — R29.898 RIGHT HAND WEAKNESS: Status: ACTIVE | Noted: 2025-02-28

## 2025-02-28 PROBLEM — Z79.4 TYPE 2 DIABETES MELLITUS WITH HYPERGLYCEMIA, WITH LONG-TERM CURRENT USE OF INSULIN (H): Status: ACTIVE | Noted: 2025-02-28

## 2025-02-28 PROBLEM — E11.65 TYPE 2 DIABETES MELLITUS WITH HYPERGLYCEMIA, WITH LONG-TERM CURRENT USE OF INSULIN (H): Status: ACTIVE | Noted: 2025-02-28

## 2025-02-28 PROBLEM — I25.810 CORONARY ARTERY DISEASE INVOLVING CORONARY BYPASS GRAFT OF NATIVE HEART WITHOUT ANGINA PECTORIS: Status: ACTIVE | Noted: 2025-02-28

## 2025-02-28 PROBLEM — E66.812 CLASS 2 SEVERE OBESITY DUE TO EXCESS CALORIES WITH SERIOUS COMORBIDITY AND BODY MASS INDEX (BMI) OF 35.0 TO 35.9 IN ADULT (H): Status: ACTIVE | Noted: 2018-04-13

## 2025-02-28 NOTE — TELEPHONE ENCOUNTER
Upon chart review noted pt is S/P 4 v CABG done at [facility] on 2/20. Per notes in CE Intra aortic Balloon Pump/IABP was removed 2/21.   Message routed to CVTS to review this referral.

## 2025-02-28 NOTE — TELEPHONE ENCOUNTER
Health Call Center    Phone Message    May a detailed message be left on voicemail: yes     Reason for Call: Appointment Intake    Referring Provider Name: Chely Ruiz MD PhD in BA FM/IM/PEDS   Diagnosis and/or Symptoms:   ST elevation myocardial infarction (STEMI), unspecified artery (H) [I21.3]  Coronary artery disease involving coronary bypass graft of native heart without ...    Pt stated he thinks he has a pacemaker- placed at [facility].  Writer is not seeing in notes.  Please review and place orders if needed.  Call pt to schedule.       Action Taken: Other: cardio    Travel Screening: Not Applicable     Date of Service:

## 2025-03-02 RX ORDER — HYDROCHLOROTHIAZIDE 12.5 MG/1
CAPSULE ORAL
Qty: 6 EACH | Refills: 3 | Status: SHIPPED | OUTPATIENT
Start: 2025-03-02 | End: 2025-03-04

## 2025-03-03 ENCOUNTER — VIRTUAL VISIT (OUTPATIENT)
Dept: EDUCATION SERVICES | Facility: CLINIC | Age: 65
End: 2025-03-03
Attending: PHYSICIAN ASSISTANT
Payer: COMMERCIAL

## 2025-03-03 ENCOUNTER — MYC MEDICAL ADVICE (OUTPATIENT)
Dept: FAMILY MEDICINE | Facility: CLINIC | Age: 65
End: 2025-03-03

## 2025-03-03 DIAGNOSIS — E11.9 TYPE 2 DIABETES MELLITUS WITHOUT COMPLICATION, WITHOUT LONG-TERM CURRENT USE OF INSULIN (H): ICD-10-CM

## 2025-03-03 PROCEDURE — 98967 PH1 ASSMT&MGMT NQHP 11-20: CPT | Mod: 93 | Performed by: DIETITIAN, REGISTERED

## 2025-03-03 NOTE — LETTER
3/3/2025         RE: Cydney Flores  [address]  [place] [address]        Dear Colleague,    Thank you for referring your patient, Cydney Flores, to the [facility]. Please see a copy of my visit note below.    Diabetes Self-Management Education & Support    Presents for: Initial Assessment for new diagnosis    Type of Service: Telephone Visit    Originating Location (Patient Location): Home  Distant Location (Provider Location): Offsite  Mode of Communication:  Telephone    Telephone Visit Start Time:  3:35p  Telephone Visit End Time (telephone visit stop time): 3:54p    How would patient like to obtain AVS? MyChart      Assessment  Here for new diagnosis ed. Daughter with patient on the phone. We reviewed his diagnosis, importance of getting the blood sugars down. He has not received his CGM yet and expresses frustration about this. The pharmacy does have his prescription, per EMR so I encouraged them to see if it is waiting at the pharmacy. They are agreeable. Blood sugars have come down significantly since starting insulin -- blood sugar this AM of 134. We reviewed goals for blood sugars. He has adjusted portions and is eating less rice, small, more frequent meals by report. No exercise at this time.     Patient's most recent   Lab Results   Component Value Date    A1C >15.0 02/11/2025    A1C 5.7 02/25/2012     is not meeting goal of <7.0    Diabetes knowledge and skills assessment:   Patient is knowledgeable in diabetes management concepts related to: none, new diagnosis     Based on learning assessment above, most appropriate setting for further diabetes education would be: Individual setting.    Care Plan and Education Provided:  Healthy Eating: Balanced meals, Consistency in amount and timing of carbohydrate intake, and Portion control, Being Active: Finding a physical activity routine that works for you and Relationship of activity to glucose, Monitoring: Blood  glucose versus Continuous Glucose Monitoring, Frequency of monitoring, and Individual glucose targets, and Taking Medication: Side effects of prescribed medication(s) and When to take medication(s)    Patient verbalized understanding of diabetes self-management education concepts discussed, opportunities for ongoing education and support, and recommendations provided today.    Plan    Continue Lantus 17 units as prescribed  Start CGMS as soon as possible and bring reader to follow up appointment  Increase exercise with goal of >150 minutes/week moderate physical activity      Topics to cover at upcoming visits: Healthy Eating, Being Active, Monitoring, Taking Medication, Problem Solving, Reducing Risks, and Healthy Coping    Follow-up:  Upcoming Diabetes Ed Appointments     Visit Type Date Time Department    NEW TYPE 2 DIABETES ED 3/3/2025  3:30 PM CS DIABETES ED    DIABETES ED 4/2/2025  7:30 AM  DIABETES ED        See Care Plan for co-developed, patient-state behavior change goals.    Education Materials Provided:  -  [employer] Understanding Diabetes Booklet   - My Plate Planner   - Blood Glucose Log Sheet   - Goals for Your Diabetes Care   - Types of Diabetes Medicines  - ADCES Diabetes Distress handout      Subjective/Objective  Cydney is an 64 year old year old, presenting for the following diabetes education related to: Presents for: Initial Assessment for new diagnosis  Accompanied by: Self, Daughter  Diabetes education in the past 24mo: No  Focus of Visit: Patient Unsure  Diabetes type: Type 2  Date of diagnosis: 2/2025  Disease course: Improving  How confident are you filling out medical forms by yourself:: Not Assessed  Diabetes management related comments/concerns: Recent hospitalization, home less than a week ago  Transportation concerns: No  Difficulty affording diabetes medication?: No  Difficulty affording diabetes testing supplies?: No  Other concerns:: English as a second language  Cultural  "Influences/Ethnic Background:  Not  or     Diabetes Symptoms & Complications:  Diabetes Related Symptoms: Polyuria (increased urination)  Symptom course: Improving  Disease course: Improving  Complications assessed today?: No    Patient Problem List and Family Medical History reviewed for relevant medical history, current medical status, and diabetes risk factors.    Vitals:  There were no vitals taken for this visit.  Estimated body mass index is 35.51 kg/m  as calculated from the following:    Height as of 2/28/25: 1.669 m (5' 5.7\").    Weight as of 2/28/25: 98.9 kg (218 lb).   Last 3 BP:   BP Readings from Last 3 Encounters:   02/28/25 118/76   02/11/25 127/86   08/18/24 (!) 150/100       History   Smoking Status     Never   Smokeless Tobacco     Never       Labs:  Lab Results   Component Value Date    A1C >15.0 02/11/2025    A1C 5.7 02/25/2012     Lab Results   Component Value Date    GLC 96 01/27/2021     Lab Results   Component Value Date    LDL  02/11/2025      Comment:      Cannot estimate LDL when triglyceride exceeds 400 mg/dL     02/11/2025    LDL  04/13/2018     Cannot estimate LDL when triglyceride exceeds 400 mg/dL    LDL 99 04/13/2018     HDL Cholesterol   Date Value Ref Range Status   04/13/2018 34 (L) >39 mg/dL Final     Direct Measure HDL   Date Value Ref Range Status   02/11/2025 28 (L) >=40 mg/dL Final   ]  GFR Estimate   Date Value Ref Range Status   01/27/2021 43 (L) >60 mL/min/[1.73_m2] Final     Comment:     Non  GFR Calc  Starting 12/18/2018, serum creatinine based estimated GFR (eGFR) will be   calculated using the Chronic Kidney Disease Epidemiology Collaboration   (CKD-EPI) equation.       GFR Estimate If Black   Date Value Ref Range Status   01/27/2021 50 (L) >60 mL/min/[1.73_m2] Final     Comment:      GFR Calc  Starting 12/18/2018, serum creatinine based estimated GFR (eGFR) will be   calculated using the Chronic Kidney Disease " "Epidemiology Collaboration   (CKD-EPI) equation.       Lab Results   Component Value Date    CR 1.70 01/27/2021     No results found for: \"MICROALBUMIN\"    Healthy Eating:  Healthy Eating Assessed Today: Yes  Cultural/Church diet restrictions?: Yes  Do you have any food allergies or intolerances?: No  Meal planning/habits: Smaller portions  Meals include: Breakfast, Dinner, Afternoon Snack, Morning Snack  Breakfast: small scoop of rice, lean protein, vegetables  Dinner: small scoop of rice, lean protein, vegetables  Snacks: salad    Being Active:  Being Active Assessed Today: Yes  Exercise:: Currently not exercising  Barrier to exercise: Physical limitation    Monitoring:  Monitoring Assessed Today: Yes  Did patient bring glucose meter to appointment? : No  Blood Glucose Meter: Unknown  Times checking blood sugar at home (number): 4  Times checking blood sugar at home (per): Day  Blood glucose trend: Decreasing    Fasting 134 this AM, in general 140-200 mg/dL by report     Taking Medications:  Diabetes Medication(s)       Insulin       insulin glargine (LANTUS PEN) 100 UNIT/ML pen Inject 17 Units subcutaneously at bedtime.          Taking Medication Assessed Today: Yes  Current Treatments: Insulin Injections  Problems taking diabetes medications regularly?: No  Diabetes medication side effects?: No    Helen Guidry RD  Time Spent: 19 minutes  Encounter Type: Individual    Any diabetes medication dose changes were made via the CDCES Standing Orders under the patient's referring provider.      "

## 2025-03-03 NOTE — PROGRESS NOTES
Diabetes Self-Management Education & Support    Presents for: Initial Assessment for new diagnosis    Type of Service: Telephone Visit    Originating Location (Patient Location): Home  Distant Location (Provider Location): Offsite  Mode of Communication:  Telephone    Telephone Visit Start Time:  3:35p  Telephone Visit End Time (telephone visit stop time): 3:54p    How would patient like to obtain AVS? Janet      Assessment  Here for new diagnosis ed. Daughter with patient on the phone. We reviewed his diagnosis, importance of getting the blood sugars down. He has not received his CGM yet and expresses frustration about this. The pharmacy does have his prescription, per EMR so I encouraged them to see if it is waiting at the pharmacy. They are agreeable. Blood sugars have come down significantly since starting insulin -- blood sugar this AM of 134. We reviewed goals for blood sugars. He has adjusted portions and is eating less rice, small, more frequent meals by report. No exercise at this time.     Patient's most recent   Lab Results   Component Value Date    A1C >15.0 02/11/2025    A1C 5.7 02/25/2012     is not meeting goal of <7.0    Diabetes knowledge and skills assessment:   Patient is knowledgeable in diabetes management concepts related to: none, new diagnosis     Based on learning assessment above, most appropriate setting for further diabetes education would be: Individual setting.    Care Plan and Education Provided:  Healthy Eating: Balanced meals, Consistency in amount and timing of carbohydrate intake, and Portion control, Being Active: Finding a physical activity routine that works for you and Relationship of activity to glucose, Monitoring: Blood glucose versus Continuous Glucose Monitoring, Frequency of monitoring, and Individual glucose targets, and Taking Medication: Side effects of prescribed medication(s) and When to take medication(s)    Patient verbalized understanding of diabetes  self-management education concepts discussed, opportunities for ongoing education and support, and recommendations provided today.    Plan    Continue Lantus 17 units as prescribed  Start CGMS as soon as possible and bring reader to follow up appointment  Increase exercise with goal of >150 minutes/week moderate physical activity      Topics to cover at upcoming visits: Healthy Eating, Being Active, Monitoring, Taking Medication, Problem Solving, Reducing Risks, and Healthy Coping    Follow-up:  Upcoming Diabetes Ed Appointments     Visit Type Date Time Department    NEW TYPE 2 DIABETES ED 3/3/2025  3:30 PM CS DIABETES ED    DIABETES ED 4/2/2025  7:30 AM  DIABETES ED        See Care Plan for co-developed, patient-state behavior change goals.    Education Materials Provided:  - [employer] Understanding Diabetes Booklet   - My Plate Planner   - Blood Glucose Log Sheet   - Goals for Your Diabetes Care   - Types of Diabetes Medicines  - ADCES Diabetes Distress handout      Subjective/Objective  Cydney is an 64 year old year old, presenting for the following diabetes education related to: Presents for: Initial Assessment for new diagnosis  Accompanied by: Self, Daughter  Diabetes education in the past 24mo: No  Focus of Visit: Patient Unsure  Diabetes type: Type 2  Date of diagnosis: 2/2025  Disease course: Improving  How confident are you filling out medical forms by yourself:: Not Assessed  Diabetes management related comments/concerns: Recent hospitalization, home less than a week ago  Transportation concerns: No  Difficulty affording diabetes medication?: No  Difficulty affording diabetes testing supplies?: No  Other concerns:: English as a second language  Cultural Influences/Ethnic Background:  Not  or     Diabetes Symptoms & Complications:  Diabetes Related Symptoms: Polyuria (increased urination)  Symptom course: Improving  Disease course: Improving  Complications assessed today?:  "No    Patient Problem List and Family Medical History reviewed for relevant medical history, current medical status, and diabetes risk factors.    Vitals:  There were no vitals taken for this visit.  Estimated body mass index is 35.51 kg/m  as calculated from the following:    Height as of 2/28/25: 1.669 m (5' 5.7\").    Weight as of 2/28/25: 98.9 kg (218 lb).   Last 3 BP:   BP Readings from Last 3 Encounters:   02/28/25 118/76   02/11/25 127/86   08/18/24 (!) 150/100       History   Smoking Status    Never   Smokeless Tobacco    Never       Labs:  Lab Results   Component Value Date    A1C >15.0 02/11/2025    A1C 5.7 02/25/2012     Lab Results   Component Value Date    GLC 96 01/27/2021     Lab Results   Component Value Date    LDL  02/11/2025      Comment:      Cannot estimate LDL when triglyceride exceeds 400 mg/dL     02/11/2025    LDL  04/13/2018     Cannot estimate LDL when triglyceride exceeds 400 mg/dL    LDL 99 04/13/2018     HDL Cholesterol   Date Value Ref Range Status   04/13/2018 34 (L) >39 mg/dL Final     Direct Measure HDL   Date Value Ref Range Status   02/11/2025 28 (L) >=40 mg/dL Final   ]  GFR Estimate   Date Value Ref Range Status   01/27/2021 43 (L) >60 mL/min/[1.73_m2] Final     Comment:     Non  GFR Calc  Starting 12/18/2018, serum creatinine based estimated GFR (eGFR) will be   calculated using the Chronic Kidney Disease Epidemiology Collaboration   (CKD-EPI) equation.       GFR Estimate If Black   Date Value Ref Range Status   01/27/2021 50 (L) >60 mL/min/[1.73_m2] Final     Comment:      GFR Calc  Starting 12/18/2018, serum creatinine based estimated GFR (eGFR) will be   calculated using the Chronic Kidney Disease Epidemiology Collaboration   (CKD-EPI) equation.       Lab Results   Component Value Date    CR 1.70 01/27/2021     No results found for: \"MICROALBUMIN\"    Healthy Eating:  Healthy Eating Assessed Today: Yes  Cultural/Denominational diet " restrictions?: Yes  Do you have any food allergies or intolerances?: No  Meal planning/habits: Smaller portions  Meals include: Breakfast, Dinner, Afternoon Snack, Morning Snack  Breakfast: small scoop of rice, lean protein, vegetables  Dinner: small scoop of rice, lean protein, vegetables  Snacks: salad    Being Active:  Being Active Assessed Today: Yes  Exercise:: Currently not exercising  Barrier to exercise: Physical limitation    Monitoring:  Monitoring Assessed Today: Yes  Did patient bring glucose meter to appointment? : No  Blood Glucose Meter: Unknown  Times checking blood sugar at home (number): 4  Times checking blood sugar at home (per): Day  Blood glucose trend: Decreasing    Fasting 134 this AM, in general 140-200 mg/dL by report     Taking Medications:  Diabetes Medication(s)       Insulin       insulin glargine (LANTUS PEN) 100 UNIT/ML pen Inject 17 Units subcutaneously at bedtime.          Taking Medication Assessed Today: Yes  Current Treatments: Insulin Injections  Problems taking diabetes medications regularly?: No  Diabetes medication side effects?: No    Helen Guidry RD  Time Spent: 19 minutes  Encounter Type: Individual    Any diabetes medication dose changes were made via the CDCES Standing Orders under the patient's referring provider.

## 2025-03-04 NOTE — TELEPHONE ENCOUNTER
Upon chart review noted pt is S/P 4 v CABG done at [facility] on 2/20. Per notes in CE Intra aortic Balloon Pump/IABP was removed 2/21. RN called and spoke with CV surgery team at [facility], pt has a  for post surgery follow-up slated for 3/12.    Scheduling notified to contact patient to  establish care with general cardiology moving forward.

## 2025-03-04 NOTE — TELEPHONE ENCOUNTER
Spoke with patient's daughter, Amita, to offer last minute opening with Dr. Rush on 3/19. Daughter states this works for them. Patient scheduled, FK appt canceled.

## 2025-03-04 NOTE — TELEPHONE ENCOUNTER
Spoke with daughter, Amita. Pt scheduled for first available with Dr. Engel in [place]. Added to the FK waitlist + MG waitlist for [facility] per request.

## 2025-03-04 NOTE — TELEPHONE ENCOUNTER
[facility] Call Center    Phone Message    May a detailed message be left on voicemail: yes     Reason for Call: Other: Daughter Amita has not heard back about scheduling her Dad for follow up. Please call her back to discuss or schedule as needed. Thank you     Action Taken: Other: cardiology    Travel Screening: Not Applicable   Thank you!  [facility]      Date of Service:

## 2025-03-04 NOTE — PROGRESS NOTES
Service Date: 3/19/2025    Chely Ruiz MD  [address] [place] [address]     RE:  Cydney Flores   MRN:  [id number]   :  1960       Dear Dr. Ruiz:    It was a pleasure participating in the care of your patient, Cydney Flores .  As you know, he is a 64 year old year old person who I met in person today for ischemic cardiomyopathy, hypertension, hyperlipidemia.    Past medical history is significant for the followin.  Type 2 diabetes  2.  Hypertension  3.  Hyperlipidemia  4.  Chronic renal sufficiency with a current baseline GFR of 49 mL/min as of 2025  5.  Obesity  6.  Gout    The patient presented to the [facility] emergency department on 2025 after experiencing 3 days of constant chest pain consistent with an ST elevation myocardial infarction.  The patient was in cardiogenic shock and taken to the Cath Lab with the following results:    2025 cath [facility]:    Left main normal  LAD mid 90% lesion  Diagonal mid 70% lesion  Circumflex dominant  OM1 ostial 75% lesion  OM 2 ostial 90% lesion  RCA occluded proximally    Aspiration thrombectomy and PTCA of the circumflex was performed, however incomplete revascularization was noted and a intra-aortic balloon pump was placed at that time and CV surgery was consulted.    The patient eventually underwent four-vessel coronary bypass surgery on 2025 (LIMA to LAD, vein graft to diagonal, vein graft to OM, vein graft to left SHIVA    Echocardiogram at that time 2025 revealed an ejection fraction of 40 to 45% with an inferolateral wall motion abnormality and mild mitral insufficiency.    The patient was referred here for continuing care and further evaluation and treatment.    From a clinical standpoint since being discharged from the hospital, he does recount his 3 days of uninterrupted chest pain before he presented for medical assistance back in February.  He describes his prior angina as first starting as a uncomfortable feeling  in the middle of his chest like drinking cold water and having get stuck there.  He then felt a cold feeling like having ice under his arms followed by chest pain radiating to both sides.  At night he would have a headache that would radiate to his shoulders and he still did not go in for medical attention for 2 or 3 days because he was on vacation.    After he had his coronary bypass surgery all of these types of symptoms have completely resolved and have not returned.  Does have some sternal tenderness but this is a different sensation to his prior angina.  He is just started cardiac rehabilitation and his first few episodes have gone well.  His blood pressures at baseline have been low around the 110 mmHg range without gross orthostasis or lightheadedness when standing.    He does use a mouthguard for his sleep apnea which is only successful if he sleeps at a slight upright incline and not flat on his back.    The patient otherwise denies gross chest pain, shortness of breath, PND, orthopnea, edema, palpitations, syncope or near syncope.    He was accompanied by his wife and his daughter Amita by phone today    MEDICATIONS:    1.  Tylenol  2.  Aspirin 325 mg a day  3.  Tessalon  4.  Colchicine 0.  6 mg twice daily  5.  Insulin  6.  Toprol-XL 25 mg a day  7.  Prednisone  8.  Rosuvastatin 40 mg a day    PHYSICAL EXAM:    Vitals: Blood pressures currently running 110/75 mmHg, pulse 87, weight 211 pounds  General:  Patient appears comfortable, well groomed  Psych:  Patient is alert and oriented X 3  Eyes:  No gross erythema, exudate  Neck:  JVP:  Pulm:  CTA  CV: Good rate and rhythm no gross murmur  Abdomen:  soft, non tender, positive bowel sounds  Lower extremities: 1-2 pitting edema bilaterally      LABS:    2/11/2025: Total cholesterol 213, triglycerides 453, HDL 28, LDL not calculated    2/26/2025: Potassium 3.9, GFR 49 hemoglobin 11.3    Echocardiogram at that time 2/18/2025 revealed an ejection fraction of 40  to 45% with an inferolateral wall motion abnormality and mild mitral insufficiency.    IMPRESSION:    Cydney is a 64-year-old gentleman whose past medical history significant for chronic renal insufficiency with a baseline GFR 49 mL/min as of 2/26/2025, along with gout who has several active cardiac issues:    1.  Mild to moderate ischemic cardiomyopathy    The patient experienced an ST elevation myocardial infarction on 2/18/2025 secondary to an acutely thrombotic dominant circumflex stenosis that did not respond well to aspiration thrombectomy and PTCA.  Four-vessel coronary bypass surgery was performed on 2/20/2025 (LIMA to LAD, vein graft to diagonal, vein graft to OM, vein graft to left SHIVA)    Ejection fraction estimated 40 to 45% with an inferolateral wall motion normality and mild MR by echo 2/18/2025.    From a clinical standpoint, since being discharged from the hospital, he has done adequately and has not had recurrent anginal symptoms.  He seems to be fairly well compensated from a fluid balance standpoint at present, however he will need optimization of his CHF regimen in the future going forward.    2.  Hypertension    Goal systolic blood pressure 115 mmHg without gross side effects or symptoms, blood pressures currently running 110 mmHg systolic.    3.  Hyperlipidemia    Recently started Crestor 40 mg a day, continue to follow, goal LDL less than 70 or preferably lower.        PLAN:    1.  CORE clinic visit in 3 to 4 months to help optimize CHF regimen.  If his baseline blood pressures are not excessively low, we could try low-dose Entresto twice daily to start with his renal function can tolerate.  CORE, switching to carvedilol can be considered as well if needed.    2.  Cardiac rehab to completion, and he can return to work once he graduates from this program    3.  Dietary and lifestyle changes to better manage his diabetes and overall risk factors would be our primary goal at this point for secondary  prevention    4.  Follow-up with me in 9 months with labs prior, earlier if needed.        Once again, it was a pleasure participating in the care of your patient, Cydney Flores .  Please feel free to contact me at any time if there are any questions regarding his care in the future.      Sincerely,          Arnav Rush M.D.  Cardiovascular Division  [facility]      Total time:  Including pre-visit chart review, in person face to face visit, post visit charting time as well as time for coordination of care:  63min

## 2025-03-07 PROBLEM — R00.1 BRADYCARDIA: Status: ACTIVE | Noted: 2025-03-07

## 2025-03-10 ENCOUNTER — TELEPHONE (OUTPATIENT)
Dept: FAMILY MEDICINE | Facility: CLINIC | Age: 65
End: 2025-03-10
Payer: COMMERCIAL

## 2025-03-12 ENCOUNTER — MYC MEDICAL ADVICE (OUTPATIENT)
Dept: FAMILY MEDICINE | Facility: CLINIC | Age: 65
End: 2025-03-12
Payer: COMMERCIAL

## 2025-03-12 DIAGNOSIS — Z79.4 TYPE 2 DIABETES MELLITUS WITH HYPERGLYCEMIA, WITH LONG-TERM CURRENT USE OF INSULIN (H): Primary | ICD-10-CM

## 2025-03-12 DIAGNOSIS — E11.65 TYPE 2 DIABETES MELLITUS WITH HYPERGLYCEMIA, WITH LONG-TERM CURRENT USE OF INSULIN (H): Primary | ICD-10-CM

## 2025-03-12 DIAGNOSIS — N18.30 CKD STAGE 3 DUE TO TYPE 2 DIABETES MELLITUS (H): ICD-10-CM

## 2025-03-12 DIAGNOSIS — E11.22 CKD STAGE 3 DUE TO TYPE 2 DIABETES MELLITUS (H): ICD-10-CM

## 2025-03-12 RX ORDER — HYDROCHLOROTHIAZIDE 12.5 MG/1
CAPSULE ORAL
Qty: 6 EACH | Refills: 3 | Status: CANCELLED | OUTPATIENT
Start: 2025-03-12

## 2025-03-12 RX ORDER — HYDROCHLOROTHIAZIDE 12.5 MG/1
CAPSULE ORAL
Qty: 6 EACH | Refills: 3 | Status: SHIPPED | OUTPATIENT
Start: 2025-03-12

## 2025-03-12 RX ORDER — KETOROLAC TROMETHAMINE 30 MG/ML
1 INJECTION, SOLUTION INTRAMUSCULAR; INTRAVENOUS ONCE
Qty: 1 EACH | Refills: 0 | Status: SHIPPED | OUTPATIENT
Start: 2025-03-12 | End: 2025-03-12

## 2025-03-12 NOTE — TELEPHONE ENCOUNTER
Routing [employer] (acquired by [employer] in 2014)t message to provider, please review and advise. James Franklin, RN, BSN

## 2025-03-12 NOTE — TELEPHONE ENCOUNTER
PA Initiation    Medication: FREESTYLE JACK 3 PLUS SENSOR MISC  Insurance Company: [employer] - Phone [phone] Fax [phone]  Pharmacy Filling the Rx: [facility] - [place], [place] - [facility] [address]  Filling Pharmacy Phone: [phone]  Filling Pharmacy Fax: [phone]  Start Date: 3/12/2025

## 2025-03-15 ENCOUNTER — LAB (OUTPATIENT)
Dept: LAB | Facility: CLINIC | Age: 65
End: 2025-03-15
Payer: COMMERCIAL

## 2025-03-15 DIAGNOSIS — E11.9 TYPE 2 DIABETES MELLITUS WITHOUT COMPLICATION, WITHOUT LONG-TERM CURRENT USE OF INSULIN (H): ICD-10-CM

## 2025-03-15 DIAGNOSIS — E78.5 HYPERLIPIDEMIA LDL GOAL <70: ICD-10-CM

## 2025-03-15 DIAGNOSIS — E11.65 TYPE 2 DIABETES MELLITUS WITH HYPERGLYCEMIA, WITH LONG-TERM CURRENT USE OF INSULIN (H): ICD-10-CM

## 2025-03-15 DIAGNOSIS — Z79.4 TYPE 2 DIABETES MELLITUS WITH HYPERGLYCEMIA, WITH LONG-TERM CURRENT USE OF INSULIN (H): ICD-10-CM

## 2025-03-15 DIAGNOSIS — E11.22 CKD STAGE 3 DUE TO TYPE 2 DIABETES MELLITUS (H): Primary | ICD-10-CM

## 2025-03-15 DIAGNOSIS — N18.30 CKD STAGE 3 DUE TO TYPE 2 DIABETES MELLITUS (H): Primary | ICD-10-CM

## 2025-03-15 LAB
ERYTHROCYTE [DISTWIDTH] IN BLOOD BY AUTOMATED COUNT: 14 % (ref 10–15)
EST. AVERAGE GLUCOSE BLD GHB EST-MCNC: 235 MG/DL
HBA1C MFR BLD: 9.8 % (ref 0–5.6)
HCT VFR BLD AUTO: 35 % (ref 40–53)
HGB BLD-MCNC: 11.2 G/DL (ref 13.3–17.7)
MCH RBC QN AUTO: 29.9 PG (ref 26.5–33)
MCHC RBC AUTO-ENTMCNC: 32 G/DL (ref 31.5–36.5)
MCV RBC AUTO: 94 FL (ref 78–100)
PLATELET # BLD AUTO: 595 10E3/UL (ref 150–450)
RBC # BLD AUTO: 3.74 10E6/UL (ref 4.4–5.9)
WBC # BLD AUTO: 8.7 10E3/UL (ref 4–11)

## 2025-03-15 PROCEDURE — 84443 ASSAY THYROID STIM HORMONE: CPT

## 2025-03-15 PROCEDURE — 82570 ASSAY OF URINE CREATININE: CPT

## 2025-03-15 PROCEDURE — 80061 LIPID PANEL: CPT

## 2025-03-15 PROCEDURE — 80053 COMPREHEN METABOLIC PANEL: CPT

## 2025-03-15 PROCEDURE — 85027 COMPLETE CBC AUTOMATED: CPT

## 2025-03-15 PROCEDURE — 82043 UR ALBUMIN QUANTITATIVE: CPT

## 2025-03-15 PROCEDURE — 83036 HEMOGLOBIN GLYCOSYLATED A1C: CPT

## 2025-03-15 PROCEDURE — 36415 COLL VENOUS BLD VENIPUNCTURE: CPT

## 2025-03-16 LAB
CHOLEST SERPL-MCNC: 120 MG/DL
FASTING STATUS PATIENT QL REPORTED: YES
HDLC SERPL-MCNC: 23 MG/DL
LDLC SERPL CALC-MCNC: 51 MG/DL
NONHDLC SERPL-MCNC: 97 MG/DL
TRIGL SERPL-MCNC: 229 MG/DL
TSH SERPL DL<=0.005 MIU/L-ACNC: 1.88 UIU/ML (ref 0.3–4.2)

## 2025-03-17 LAB
ALBUMIN SERPL BCG-MCNC: 3.7 G/DL (ref 3.5–5.2)
ALP SERPL-CCNC: 128 U/L (ref 40–150)
ALT SERPL W P-5'-P-CCNC: 53 U/L (ref 0–70)
ANION GAP SERPL CALCULATED.3IONS-SCNC: 13 MMOL/L (ref 7–15)
AST SERPL W P-5'-P-CCNC: 28 U/L (ref 0–45)
BILIRUB SERPL-MCNC: 0.6 MG/DL
BUN SERPL-MCNC: 30.1 MG/DL (ref 8–23)
CALCIUM SERPL-MCNC: 9.1 MG/DL (ref 8.8–10.4)
CHLORIDE SERPL-SCNC: 107 MMOL/L (ref 98–107)
CREAT SERPL-MCNC: 1.86 MG/DL (ref 0.67–1.17)
CREAT UR-MCNC: 140 MG/DL
EGFRCR SERPLBLD CKD-EPI 2021: 40 ML/MIN/1.73M2
FASTING STATUS PATIENT QL REPORTED: YES
GLUCOSE SERPL-MCNC: 123 MG/DL (ref 70–99)
HCO3 SERPL-SCNC: 19 MMOL/L (ref 22–29)
MICROALBUMIN UR-MCNC: 104 MG/L
MICROALBUMIN/CREAT UR: 74.29 MG/G CR (ref 0–17)
POTASSIUM SERPL-SCNC: 4.3 MMOL/L (ref 3.4–5.3)
PROT SERPL-MCNC: 7.9 G/DL (ref 6.4–8.3)
SODIUM SERPL-SCNC: 139 MMOL/L (ref 135–145)

## 2025-03-17 NOTE — TELEPHONE ENCOUNTER
PRIOR AUTHORIZATION DENIED    Medication: FREESTYLE JACK 3 PLUS SENSOR MISC  Insurance Company: [employer] - Phone [phone] Fax [phone]  Denial Date: 3/13/2025  Denial Reason(s):       Appeal Information:           Patient Notified: NO

## 2025-03-18 NOTE — TELEPHONE ENCOUNTER
This is not appealable. His insurance plan does not cover this.   Options are self pay to get it or use glucometer. He has prescriptions for glucometer and supplies.

## 2025-03-19 ENCOUNTER — OFFICE VISIT (OUTPATIENT)
Dept: CARDIOLOGY | Facility: CLINIC | Age: 65
End: 2025-03-19
Payer: COMMERCIAL

## 2025-03-19 VITALS
DIASTOLIC BLOOD PRESSURE: 75 MMHG | OXYGEN SATURATION: 95 % | SYSTOLIC BLOOD PRESSURE: 110 MMHG | WEIGHT: 211 LBS | BODY MASS INDEX: 33.91 KG/M2 | HEART RATE: 90 BPM | HEIGHT: 66 IN

## 2025-03-19 DIAGNOSIS — E78.5 HYPERLIPIDEMIA LDL GOAL <70: ICD-10-CM

## 2025-03-19 DIAGNOSIS — I25.810 CORONARY ARTERY DISEASE INVOLVING CORONARY BYPASS GRAFT OF NATIVE HEART WITHOUT ANGINA PECTORIS: Primary | ICD-10-CM

## 2025-03-19 DIAGNOSIS — I10 ESSENTIAL HYPERTENSION WITH GOAL BLOOD PRESSURE LESS THAN 140/90: ICD-10-CM

## 2025-03-19 NOTE — NURSING NOTE
Med Reconcile: Reviewed and verified all current medications with the patient. The updated medication list was printed and given to the patient./ No medication changes.     Return Appointment  -CORE enrollment in 3-4 months   - Follow-up with Dr Rush in 6-9 months with labs prior

## 2025-03-19 NOTE — PATIENT INSTRUCTIONS
Take your medicines every day, as directed     Changes made today:  No medication changes   CORE enrollment in 3-4 months        Cardiology Care Coordinators:      Tamy DE JESUS RN     Cardiology Rooming Staff:  Monica SOTO EMT    Phone  [phone]      Fax [phone]    To Contact us     During Business Hours:  [phone]     If you are needing refills please contact your pharmacy.     For urgent after hour care please call the [facility] at [phone] or the [facility] at [phone] and ask to speak to the cardiologist on call.    If you are having a medical emergency, please call 911.            HOW TO CHECK YOUR BLOOD PRESSURE AT HOME:     Avoid eating, smoking, and exercising for at least 30 minutes before taking a reading.     Be sure you have taken your BP medication at least 2-3 hours before you check it.      Sit quietly for 10 minutes before a reading.      Sit in a chair with your feet flat on the floor. Rest your  arm on a table so that the arm cuff is at the same level as your heart.     Remain still during the reading.  Record your blood pressure and pulse in a log and bring to your next appointment.       Use [employer] allows you to communicate directly with your heart team through secure messaging.  [employer] can be accessed any time on your phone, computer, or tablet.  If you need assistance, we'd be happy to help!             Keep your Heart Appointments:     Follow-up with Dr Rush in 6-9 months with labs prior        C.O.R.E CLIN[facility]: Cardiomyopathy, Optimization, Rehabilitation, Education   The C.O.R.E. CLINIC is a heart failure specialty clinic within the [facility] where you will work with specialized nurse practioners dedicated to helping patients with heart failure carefully adjust  medications, receive education and learn who and when to call if symptoms develop. They specialize in helping you better understand your condition, slowing the progression of your disease, improving the length and quality of your life, helping you detect future heart problems before they become life threatening, and avoiding hospitalizations.

## 2025-03-19 NOTE — NURSING NOTE
"Chief Complaint   Patient presents with    New Patient     Coronary artery disease involving coronary bypass graft of native heart without angina pectoris         Initial /75 (BP Location: Right arm, Patient Position: Chair, Cuff Size: Adult Regular)   Pulse 90   Ht 1.676 m (5' 6\")   Wt 95.7 kg (211 lb)   SpO2 95%   BMI 34.06 kg/m   Estimated body mass index is 34.06 kg/m  as calculated from the following:    Height as of this encounter: 1.676 m (5' 6\").    Weight as of this encounter: 95.7 kg (211 lb)..  BP completed using cuff size: jude RIVAS  "

## 2025-03-20 LAB
ATRIAL RATE - MUSE: 66 BPM
DIASTOLIC BLOOD PRESSURE - MUSE: NORMAL MMHG
INTERPRETATION ECG - MUSE: NORMAL
P AXIS - MUSE: 25 DEGREES
PR INTERVAL - MUSE: 194 MS
QRS DURATION - MUSE: 94 MS
QT - MUSE: 402 MS
QTC - MUSE: 421 MS
R AXIS - MUSE: -47 DEGREES
SYSTOLIC BLOOD PRESSURE - MUSE: NORMAL MMHG
T AXIS - MUSE: -57 DEGREES
VENTRICULAR RATE- MUSE: 66 BPM

## 2025-03-24 ENCOUNTER — OFFICE VISIT (OUTPATIENT)
Dept: OPTOMETRY | Facility: CLINIC | Age: 65
End: 2025-03-24
Attending: INTERNAL MEDICINE
Payer: COMMERCIAL

## 2025-03-24 DIAGNOSIS — H52.4 PRESBYOPIA: ICD-10-CM

## 2025-03-24 DIAGNOSIS — E11.9 TYPE 2 DIABETES MELLITUS WITHOUT COMPLICATION, WITHOUT LONG-TERM CURRENT USE OF INSULIN (H): Primary | ICD-10-CM

## 2025-03-24 DIAGNOSIS — I10 ESSENTIAL HYPERTENSION WITH GOAL BLOOD PRESSURE LESS THAN 140/90: ICD-10-CM

## 2025-03-24 DIAGNOSIS — H35.00 RETINOPATHY: ICD-10-CM

## 2025-03-24 DIAGNOSIS — H35.81 COTTON WOOL SPOTS: ICD-10-CM

## 2025-03-24 DIAGNOSIS — I25.810 CORONARY ARTERY DISEASE INVOLVING CORONARY BYPASS GRAFT OF NATIVE HEART WITHOUT ANGINA PECTORIS: ICD-10-CM

## 2025-03-24 PROCEDURE — 92015 DETERMINE REFRACTIVE STATE: CPT | Performed by: OPTOMETRIST

## 2025-03-24 PROCEDURE — 92004 COMPRE OPH EXAM NEW PT 1/>: CPT | Performed by: OPTOMETRIST

## 2025-03-24 ASSESSMENT — REFRACTION_MANIFEST
OD_SPHERE: +0.25
OS_SPHERE: PLANO
METHOD_AUTOREFRACTION: 1
OS_CYLINDER: SPHERE
OD_ADD: +2.00
OD_CYLINDER: SPHERE
OS_ADD: +2.00

## 2025-03-24 ASSESSMENT — KERATOMETRY
OD_K2POWER_DIOPTERS: 41.00
OS_AXISANGLE_DEGREES: 110
OS_K2POWER_DIOPTERS: 41.00
OS_K1POWER_DIOPTERS: 40.75
OD_AXISANGLE_DEGREES: 073
OD_AXISANGLE2_DEGREES: 163
OD_K1POWER_DIOPTERS: 40.50
OS_AXISANGLE2_DEGREES: 020

## 2025-03-24 ASSESSMENT — CONF VISUAL FIELD
OD_INFERIOR_TEMPORAL_RESTRICTION: 0
OS_NORMAL: 1
OD_NORMAL: 1
OD_INFERIOR_NASAL_RESTRICTION: 0
OD_SUPERIOR_TEMPORAL_RESTRICTION: 0
OS_INFERIOR_TEMPORAL_RESTRICTION: 0
OS_INFERIOR_NASAL_RESTRICTION: 0
OD_SUPERIOR_NASAL_RESTRICTION: 0
OS_SUPERIOR_NASAL_RESTRICTION: 0
OS_SUPERIOR_TEMPORAL_RESTRICTION: 0

## 2025-03-24 ASSESSMENT — VISUAL ACUITY
OS_SC+: -1
OS_SC: 20/20
OD_SC: 20/20
OD_CC: 20/70-1
OS_CC: 20/40-1
CORRECTION_TYPE: GLASSES
METHOD: SNELLEN - LINEAR
OD_SC+: -1

## 2025-03-24 ASSESSMENT — CUP TO DISC RATIO
OS_RATIO: 0.2
OD_RATIO: 0.2

## 2025-03-24 ASSESSMENT — TONOMETRY
OD_IOP_MMHG: 16.0
OS_IOP_MMHG: 17.0
IOP_METHOD: ICARE

## 2025-03-24 ASSESSMENT — SLIT LAMP EXAM - LIDS
COMMENTS: DERMATOCHALASIS
COMMENTS: DERMATOCHALASIS

## 2025-03-24 ASSESSMENT — EXTERNAL EXAM - RIGHT EYE: OD_EXAM: NORMAL

## 2025-03-24 ASSESSMENT — EXTERNAL EXAM - LEFT EYE: OS_EXAM: NORMAL

## 2025-03-24 NOTE — LETTER
3/24/2025      Cydney Flores  [address] [facility] [address]      Dear Colleague,    Thank you for referring your patient, Cydney Flores, to the [facility]. Please see a copy of my visit note below.    Chief Complaint   Patient presents with     Diabetic Eye Exam        Chief Complaint(s) and History of Present Illness(es)       Diabetic Eye Exam              Diabetes Type: Type 2 and on insulin    Duration: 1 month                   Lab Results   Component Value Date    A1C 9.8 03/15/2025    A1C >15.0 02/11/2025    A1C 5.7 02/25/2012       Patient had heart attack February 20,2025 and had bypass surgery.       Last Eye Exam: 1st eye exam  Dilated Previously: No, side effects of dilation explained today    What are you currently using to see? Just started using otc readers last month     Distance Vision Acuity: Satisfied with vision    Near Vision Acuity: Not satisfied     Eye Comfort: good  Do you use eye drops? : No  Occupation or Hobbies: medical implant      Kelsi England Optometric Assistant, A.B.O.C.     Medical, surgical and family histories reviewed and updated 3/24/2025.       OBJECTIVE: See Ophthalmology exam    ASSESSMENT:    ICD-10-CM    1. Type 2 diabetes mellitus without complication, without long-term current use of insulin (H)  E11.9 Adult Eye  Referral     EYE EXAM (SIMPLE-NONBILLABLE)     Adult Eye  Referral      2. Essential hypertension with goal blood pressure less than 140/90  I10 EYE EXAM (SIMPLE-NONBILLABLE)      3. Coronary artery disease involving coronary bypass graft of native heart without angina pectoris  I25.810 EYE EXAM (SIMPLE-NONBILLABLE)      4. Retinopathy  H35.00 EYE EXAM (SIMPLE-NONBILLABLE)     Adult Eye  Referral      5. Cotton wool spots  H35.81 EYE EXAM (SIMPLE-NONBILLABLE)     Adult Eye  Referral      6. Presbyopia  H52.4 REFRACTION          PLAN:    Cydney Flores aware  eye exam results will be sent  to Chely Ruiz.  Patient Instructions   Referral to retinal specialist for evaluation and continued care.    It is important to keep blood sugar/blood pressure/cholesterol under good control.    OTC readers ok- +2.25 for near- +1.25 for arms length.  Return for refraction when blood sugar is stable if interested in prescription for no line bifocal.    See me as needed for glasses.    Ric Pineda, OD                         Again, thank you for allowing me to participate in the care of your patient.        Sincerely,        Ric Pineda, ERMELINDA    Electronically signed

## 2025-03-24 NOTE — PROGRESS NOTES
Chief Complaint   Patient presents with    Diabetic Eye Exam        Chief Complaint(s) and History of Present Illness(es)       Diabetic Eye Exam              Diabetes Type: Type 2 and on insulin    Duration: 1 month                   Lab Results   Component Value Date    A1C 9.8 03/15/2025    A1C >15.0 02/11/2025    A1C 5.7 02/25/2012       Patient had heart attack February 20,2025 and had bypass surgery.       Last Eye Exam: 1st eye exam  Dilated Previously: No, side effects of dilation explained today    What are you currently using to see? Just started using otc readers last month     Distance Vision Acuity: Satisfied with vision    Near Vision Acuity: Not satisfied     Eye Comfort: good  Do you use eye drops? : No  Occupation or Hobbies: medical implant      Kelsi England Optometric Assistant, A.B.O.C.     Medical, surgical and family histories reviewed and updated 3/24/2025.       OBJECTIVE: See Ophthalmology exam    ASSESSMENT:    ICD-10-CM    1. Type 2 diabetes mellitus without complication, without long-term current use of insulin (H)  E11.9 Adult Eye  Referral     EYE EXAM (SIMPLE-NONBILLABLE)     Adult Eye  Referral      2. Essential hypertension with goal blood pressure less than 140/90  I10 EYE EXAM (SIMPLE-NONBILLABLE)      3. Coronary artery disease involving coronary bypass graft of native heart without angina pectoris  I25.810 EYE EXAM (SIMPLE-NONBILLABLE)      4. Retinopathy  H35.00 EYE EXAM (SIMPLE-NONBILLABLE)     Adult Eye  Referral      5. Cotton wool spots  H35.81 EYE EXAM (SIMPLE-NONBILLABLE)     Adult Eye  Referral      6. Presbyopia  H52.4 REFRACTION          PLAN:    Cydney kelsey  eye exam results will be sent to Chely Ruiz.  Patient Instructions   Referral to retinal specialist for evaluation and continued care.    It is important to keep blood sugar/blood pressure/cholesterol under good control.    OTC readers ok- +2.25 for near- +1.25 for arms  length.  Return for refraction when blood sugar is stable if interested in prescription for no line bifocal.    See me as needed for glasses.    Ric Pineda, OD

## 2025-03-24 NOTE — PATIENT INSTRUCTIONS
Referral to retinal specialist for evaluation and continued care.    It is important to keep blood sugar/blood pressure/cholesterol under good control.    OTC readers ok- +2.25 for near- +1.25 for arms length.  Return for refraction when blood sugar is stable if interested in prescription for no line bifocal.    See me as needed for glasses.    Ric Pineda, OD    The affects of the dilating drops last for 4- 6 hours.  You will be more sensitive to light and vision will be blurry up close.  Do not drive if you do not feel comfortable.  Mydriatic sunglasses were given if needed.    Patient Education   Diabetes weakens the blood vessels all over the body, including the eyes. Damage to the blood vessels in the eyes can cause swelling or bleeding into part of the eye (called the retina). This is called diabetic retinopathy (LUANNE-tin--pu-thee). If not treated, this disease can cause vision loss or blindness.   Symptoms may include blurred or distorted vision, but many people have no symptoms. It's important to see your eye doctor regularly to check for problems.   Early treatment and good control can help protect your vision. Here are the things you can do to help prevent vision loss:      1. Keep your blood sugar levels under tight control.      2. Bring high blood pressure under control.      3. No smoking.      4. Have yearly dilated eye exams.       Optometry Providers       Clinic Locations                                 Telephone Number   Dr. Simin Sin   [facility] [phone]     [facility] Hours:                [facility] Hours:       [facility] Hours:   [address]   [address]     [address] [facility] [address] [facility] [address] [facility]  [place] [address]   [address] [address] [address]    [place]  [address]  Phone: [phone]                    Phone: [phone]     Phone: [phone]                      Monday 8:00-6:00                          Monday 8:00-6:00                          Monday 8:00-6:00              Tuesday 8:00-6:00                          Tuesday 8:00-6:00                          Tuesday 8:00-6:00              Wednesday 8:00-6:00                  Wednesday 8:00-6:00                   Wednesday 8:00-6:00      Thursday 8:00-6:00                        Thursday 8:00-6:00                         Thursday 8:00-6:00            Friday 8:00-5:00                              Friday 8:00-5:00                              Friday 8:00-5:00    [facility] Hours:   [address] [place] [address]  [phone]    Monday 9:00-6:00  Tuesday 9:00-6:00  Wednesday 9:00-6:00  Thursday 9:00-6:00  Friday 9:00-5:00  As always, Thank you for trusting us with your health care needs!

## 2025-03-27 ENCOUNTER — MYC MEDICAL ADVICE (OUTPATIENT)
Dept: FAMILY MEDICINE | Facility: CLINIC | Age: 65
End: 2025-03-27
Payer: COMMERCIAL

## 2025-03-27 NOTE — TELEPHONE ENCOUNTER
Sent 2 msgs to Ketty Gaona since that is who originally sent us the msg asking if she can resend since we don't have it in clinic, I did also let pt know this via [employer]. I did ask pharmacy since Dr. Ruiz is out until 4/10 can a covering provider sign off on it. Will wit to hear back from pharmacy and then update pt when we do.

## 2025-04-04 NOTE — TELEPHONE ENCOUNTER
Sending to provider pool to see if another provider is able to complete for pt.   Mandy Weldon, CMA

## 2025-04-04 NOTE — TELEPHONE ENCOUNTER
I was able to copy and past the link in original msg and print off a blank CMN form for pt. Will place form in providers in-basket and update pt.  Mandy Weldon, CMA

## 2025-04-08 ENCOUNTER — DOCUMENTATION ONLY (OUTPATIENT)
Dept: ANTICOAGULATION | Facility: CLINIC | Age: 65
End: 2025-04-08
Payer: COMMERCIAL

## 2025-04-08 ENCOUNTER — MEDICAL CORRESPONDENCE (OUTPATIENT)
Dept: HEALTH INFORMATION MANAGEMENT | Facility: CLINIC | Age: 65
End: 2025-04-08
Payer: COMMERCIAL

## 2025-04-08 NOTE — PROGRESS NOTES
Anticoagulant Therapeutic Duplication    Duplicate orders identified: identical order(s)    Duplicate orders appropriate separate rx for a free 30 day supply    Active anticoagulant: apixaban (Eliquis)    Plan made per [facility] anticoagulation protocol.    Latasha Huber RN  4/8/2025

## 2025-04-18 ENCOUNTER — TELEPHONE (OUTPATIENT)
Dept: CARDIOLOGY | Facility: CLINIC | Age: 65
End: 2025-04-18

## 2025-04-18 DIAGNOSIS — I49.3 PVC (PREMATURE VENTRICULAR CONTRACTION): Primary | ICD-10-CM

## 2025-04-18 NOTE — TELEPHONE ENCOUNTER
Health Call Center    Phone Message    May a detailed message be left on voicemail: yes     Reason for Call: Other: Pepper with [facility] called in stated that pt has been experiencing more frequent PVC's, and also more bigeminy at rest. Heart rate at rest is in the 70s and 130 with exercise. Blood pressure at rest is in the 100 (teens) and 130 with exercise. Please call Pepper back to further discuss at [phone]. They will be faxing over reading strips over.     Action Taken: Other: Cardiology    Travel Screening: Not Applicable     Thank you!  [facility]

## 2025-04-23 ENCOUNTER — ORDERS ONLY (AUTO-RELEASED) (OUTPATIENT)
Dept: CARDIOLOGY | Facility: CLINIC | Age: 65
End: 2025-04-23
Payer: COMMERCIAL

## 2025-04-23 DIAGNOSIS — I49.3 PVC (PREMATURE VENTRICULAR CONTRACTION): ICD-10-CM

## 2025-04-23 NOTE — TELEPHONE ENCOUNTER
Dr. Rush has reviewed strips/fax information. Okay to continue cardiac rehab. Will do a 14 day ziopatch to be mailed to pt. [facility]

## 2025-04-28 ENCOUNTER — TELEPHONE (OUTPATIENT)
Dept: CARDIOLOGY | Facility: CLINIC | Age: 65
End: 2025-04-28
Payer: COMMERCIAL

## 2025-04-28 NOTE — TELEPHONE ENCOUNTER
"Called Cydney to relay the following [employer] message     \"Hi Cydney,   We received a message from cardiac rehab with mention of you having PVC's (extra beats) during cardiac rehab. Dr. Rush would like a ziopatch (monitor) to be mailed to you, that you would wear for 14 days to make sure your heart is not going into any abnormal rhythm. Okay to continue cardiac rehab.\"     He has received monitor and will place it. No questions at this time.       "

## 2025-05-01 DIAGNOSIS — E11.9 TYPE 2 DIABETES MELLITUS WITHOUT COMPLICATION, WITHOUT LONG-TERM CURRENT USE OF INSULIN (H): Primary | ICD-10-CM

## 2025-05-01 DIAGNOSIS — H43.393 VITREOUS SYNERESIS OF BOTH EYES: ICD-10-CM

## 2025-05-10 ENCOUNTER — LAB (OUTPATIENT)
Dept: LAB | Facility: CLINIC | Age: 65
End: 2025-05-10
Payer: COMMERCIAL

## 2025-05-10 DIAGNOSIS — Z79.4 TYPE 2 DIABETES MELLITUS WITH HYPERGLYCEMIA, WITH LONG-TERM CURRENT USE OF INSULIN (H): ICD-10-CM

## 2025-05-10 DIAGNOSIS — E11.65 TYPE 2 DIABETES MELLITUS WITH HYPERGLYCEMIA, WITH LONG-TERM CURRENT USE OF INSULIN (H): ICD-10-CM

## 2025-05-10 LAB
EST. AVERAGE GLUCOSE BLD GHB EST-MCNC: 126 MG/DL
HBA1C MFR BLD: 6 % (ref 0–5.6)

## 2025-05-10 PROCEDURE — 83036 HEMOGLOBIN GLYCOSYLATED A1C: CPT

## 2025-05-10 PROCEDURE — 36415 COLL VENOUS BLD VENIPUNCTURE: CPT

## 2025-05-13 ENCOUNTER — RESULTS FOLLOW-UP (OUTPATIENT)
Dept: FAMILY MEDICINE | Facility: CLINIC | Age: 65
End: 2025-05-13

## 2025-05-14 ENCOUNTER — OFFICE VISIT (OUTPATIENT)
Dept: OPHTHALMOLOGY | Facility: CLINIC | Age: 65
End: 2025-05-14
Attending: OPHTHALMOLOGY
Payer: COMMERCIAL

## 2025-05-14 DIAGNOSIS — E11.319 TYPE 2 DIABETES MELLITUS WITH BOTH EYES AFFECTED BY RETINOPATHY WITHOUT MACULAR EDEMA, WITHOUT LONG-TERM CURRENT USE OF INSULIN, UNSPECIFIED RETINOPATHY SEVERITY (H): Primary | ICD-10-CM

## 2025-05-14 DIAGNOSIS — H43.393 VITREOUS SYNERESIS OF BOTH EYES: ICD-10-CM

## 2025-05-14 DIAGNOSIS — H35.81 COTTON WOOL SPOTS: ICD-10-CM

## 2025-05-14 DIAGNOSIS — H35.00 RETINOPATHY: ICD-10-CM

## 2025-05-14 DIAGNOSIS — E11.9 TYPE 2 DIABETES MELLITUS WITHOUT COMPLICATION, WITHOUT LONG-TERM CURRENT USE OF INSULIN (H): ICD-10-CM

## 2025-05-14 PROCEDURE — 92134 CPTRZ OPH DX IMG PST SGM RTA: CPT | Performed by: OPHTHALMOLOGY

## 2025-05-14 PROCEDURE — 99213 OFFICE O/P EST LOW 20 MIN: CPT | Performed by: OPHTHALMOLOGY

## 2025-05-14 PROCEDURE — 92250 FUNDUS PHOTOGRAPHY W/I&R: CPT | Performed by: OPHTHALMOLOGY

## 2025-05-14 ASSESSMENT — VISUAL ACUITY
OD_SC+: -3
OS_SC: 20/20
METHOD: SNELLEN - LINEAR
OD_SC: 20/20
OS_SC+: -2

## 2025-05-14 ASSESSMENT — TONOMETRY
OS_IOP_MMHG: 17
OD_IOP_MMHG: 12
IOP_METHOD: ICARE

## 2025-05-14 ASSESSMENT — SLIT LAMP EXAM - LIDS
COMMENTS: DERMATOCHALASIS
COMMENTS: DERMATOCHALASIS

## 2025-05-14 ASSESSMENT — CUP TO DISC RATIO
OD_RATIO: 0.2
OS_RATIO: 0.2

## 2025-05-14 ASSESSMENT — EXTERNAL EXAM - LEFT EYE: OS_EXAM: NORMAL

## 2025-05-14 ASSESSMENT — EXTERNAL EXAM - RIGHT EYE: OD_EXAM: NORMAL

## 2025-05-14 NOTE — PROGRESS NOTES
CC -   diabetic retinal examination    INTERVAL HISTORY - Initial visit    KATELYNN Flores is a 64 year old patient with recent diagnosis of DM II (very high BS recently) is here for retinal examination.  Denies change in vision; denies pain, new floaters, or new flashes.  Had occasional flashes in both eyes for long time but no flashes since heart stent in 2/2025.  Referred by Dr. Pineda for evaluation.     Lab Results   Component Value Date    A1C 6.0 05/10/2025    A1C 9.8 03/15/2025    A1C >15.0 02/11/2025    A1C 5.7 02/25/2012         RETINAL IMAGING:  OCT 5/14/25  OU - normal foveal contour; PHF attached; some CWS        ASSESSMENT & PLAN    1. Type 2 diabetes mellitus with both eyes affected by retinopathy without macular edema, without long-term current use of insulin, unspecified retinopathy severity (H)    2. Type 2 diabetes mellitus without complication, without long-term current use of insulin (H)    3. Retinopathy    4. Cotton wool spots    5. Vitreous syneresis of both eyes      Recently diagnosed poorly condrolled DM II; now better controlled  CWS seen in posterior pole ou is resolving now  Foveal appears normal with no edema  Will do FA in the future  discussed the importance of good BP/BS/Chol control          return to clinic: 6 months for DFE and OCT and optos ou     Complete documentation of historical and exam elements from today's encounter can be found in the full encounter summary report (not reduplicated in this progress note). I personally obtained the chief complaint(s) and history of present illness.  I confirmed and edited as necessary the review of systems, past medical/surgical history, family history, social history, and examination findings as documented by others; and I examined the patient myself. I personally reviewed the relevant tests, images, and reports as documented above. I formulated and edited as necessary the assessment and plan and discussed the findings and management  plan with the patient and family.     Chetan Perez MD, PhD

## 2025-05-14 NOTE — NURSING NOTE
Chief Complaints and History of Present Illnesses   Patient presents with    Retinal Evaluation     Chief Complaint(s) and History of Present Illness(es)       Retinal Evaluation               Comments    Patient states no recent changes in vision. No pain or irritation. No flashes of light, some floaters BE.    Ocular Meds: None    Lab Results       Component                Value               Date                       A1C                      6.0                 05/10/2025                 A1C                      9.8                 03/15/2025                 A1C                      >15.0               02/11/2025                 A1C                      5.7                 02/25/2012                Evelyn Harding OA 2:19 PM May 14, 2025

## 2025-05-19 ENCOUNTER — OFFICE VISIT (OUTPATIENT)
Dept: FAMILY MEDICINE | Facility: CLINIC | Age: 65
End: 2025-05-19
Payer: COMMERCIAL

## 2025-05-19 ENCOUNTER — TELEPHONE (OUTPATIENT)
Dept: FAMILY MEDICINE | Facility: CLINIC | Age: 65
End: 2025-05-19

## 2025-05-19 VITALS
DIASTOLIC BLOOD PRESSURE: 86 MMHG | TEMPERATURE: 97 F | SYSTOLIC BLOOD PRESSURE: 124 MMHG | HEIGHT: 65 IN | WEIGHT: 217 LBS | HEART RATE: 60 BPM | BODY MASS INDEX: 36.15 KG/M2 | RESPIRATION RATE: 16 BRPM | OXYGEN SATURATION: 95 %

## 2025-05-19 DIAGNOSIS — Z79.4 TYPE 2 DIABETES MELLITUS WITH HYPERGLYCEMIA, WITH LONG-TERM CURRENT USE OF INSULIN (H): Primary | ICD-10-CM

## 2025-05-19 DIAGNOSIS — Z12.5 SCREENING FOR PROSTATE CANCER: ICD-10-CM

## 2025-05-19 DIAGNOSIS — E66.01 CLASS 2 SEVERE OBESITY WITH SERIOUS COMORBIDITY AND BODY MASS INDEX (BMI) OF 35.0 TO 35.9 IN ADULT, UNSPECIFIED OBESITY TYPE (H): ICD-10-CM

## 2025-05-19 DIAGNOSIS — E11.65 TYPE 2 DIABETES MELLITUS WITH HYPERGLYCEMIA, WITH LONG-TERM CURRENT USE OF INSULIN (H): ICD-10-CM

## 2025-05-19 DIAGNOSIS — Z12.11 SCREEN FOR COLON CANCER: ICD-10-CM

## 2025-05-19 DIAGNOSIS — Z79.4 TYPE 2 DIABETES MELLITUS WITH HYPERGLYCEMIA, WITH LONG-TERM CURRENT USE OF INSULIN (H): ICD-10-CM

## 2025-05-19 DIAGNOSIS — E11.9 TYPE 2 DIABETES MELLITUS WITHOUT COMPLICATION, WITHOUT LONG-TERM CURRENT USE OF INSULIN (H): ICD-10-CM

## 2025-05-19 DIAGNOSIS — E11.65 TYPE 2 DIABETES MELLITUS WITH HYPERGLYCEMIA, WITH LONG-TERM CURRENT USE OF INSULIN (H): Primary | ICD-10-CM

## 2025-05-19 DIAGNOSIS — E66.812 CLASS 2 SEVERE OBESITY WITH SERIOUS COMORBIDITY AND BODY MASS INDEX (BMI) OF 35.0 TO 35.9 IN ADULT, UNSPECIFIED OBESITY TYPE (H): ICD-10-CM

## 2025-05-19 DIAGNOSIS — I21.3 ST ELEVATION MYOCARDIAL INFARCTION (STEMI), UNSPECIFIED ARTERY (H): ICD-10-CM

## 2025-05-19 DIAGNOSIS — E11.22 CKD STAGE 3 DUE TO TYPE 2 DIABETES MELLITUS (H): ICD-10-CM

## 2025-05-19 DIAGNOSIS — N18.30 CKD STAGE 3 DUE TO TYPE 2 DIABETES MELLITUS (H): ICD-10-CM

## 2025-05-19 DIAGNOSIS — Z00.00 ROUTINE GENERAL MEDICAL EXAMINATION AT A HEALTH CARE FACILITY: Primary | ICD-10-CM

## 2025-05-19 LAB
ALBUMIN UR-MCNC: 30 MG/DL
APPEARANCE UR: CLEAR
BACTERIA #/AREA URNS HPF: ABNORMAL /HPF
BILIRUB UR QL STRIP: NEGATIVE
COLOR UR AUTO: YELLOW
GLUCOSE UR STRIP-MCNC: NEGATIVE MG/DL
HGB UR QL STRIP: ABNORMAL
KETONES UR STRIP-MCNC: NEGATIVE MG/DL
LEUKOCYTE ESTERASE UR QL STRIP: NEGATIVE
NITRATE UR QL: NEGATIVE
PH UR STRIP: 5.5 [PH] (ref 5–7)
RBC #/AREA URNS AUTO: ABNORMAL /HPF
SP GR UR STRIP: 1.02 (ref 1–1.03)
UROBILINOGEN UR STRIP-ACNC: 0.2 E.U./DL
WBC #/AREA URNS AUTO: ABNORMAL /HPF

## 2025-05-19 PROCEDURE — 99396 PREV VISIT EST AGE 40-64: CPT | Mod: 25 | Performed by: FAMILY MEDICINE

## 2025-05-19 PROCEDURE — 36415 COLL VENOUS BLD VENIPUNCTURE: CPT | Performed by: FAMILY MEDICINE

## 2025-05-19 PROCEDURE — 90677 PCV20 VACCINE IM: CPT | Performed by: FAMILY MEDICINE

## 2025-05-19 PROCEDURE — 90471 IMMUNIZATION ADMIN: CPT | Performed by: FAMILY MEDICINE

## 2025-05-19 PROCEDURE — G0103 PSA SCREENING: HCPCS | Performed by: FAMILY MEDICINE

## 2025-05-19 PROCEDURE — 81001 URINALYSIS AUTO W/SCOPE: CPT | Performed by: FAMILY MEDICINE

## 2025-05-19 RX ORDER — HYDROCHLOROTHIAZIDE 12.5 MG/1
CAPSULE ORAL
Qty: 6 EACH | Refills: 3 | Status: SHIPPED | OUTPATIENT
Start: 2025-05-19 | End: 2025-05-22

## 2025-05-19 SDOH — HEALTH STABILITY: PHYSICAL HEALTH: ON AVERAGE, HOW MANY DAYS PER WEEK DO YOU ENGAGE IN MODERATE TO STRENUOUS EXERCISE (LIKE A BRISK WALK)?: 3 DAYS

## 2025-05-19 ASSESSMENT — SOCIAL DETERMINANTS OF HEALTH (SDOH): HOW OFTEN DO YOU GET TOGETHER WITH FRIENDS OR RELATIVES?: NEVER

## 2025-05-19 NOTE — TELEPHONE ENCOUNTER
Please initiate a prior authorization    Thank you,  Snehal Ellsworth, Pharmacy Technician  [facility] [place]

## 2025-05-19 NOTE — PATIENT INSTRUCTIONS
Patient Education   Preventive Care Advice   This is general advice given by our system to help you stay healthy. However, your care team may have specific advice just for you. Please talk to your care team about your preventive care needs.  Nutrition  Eat 5 or more servings of fruits and vegetables each day.  Try wheat bread, brown rice and whole grain pasta (instead of white bread, rice, and pasta).  Get enough calcium and vitamin D. Check the label on foods and aim for 100% of the RDA (recommended daily allowance).  Lifestyle  Exercise at least 150 minutes each week  (30 minutes a day, 5 days a week).  Do muscle strengthening activities 2 days a week. These help control your weight and prevent disease.  No smoking.  Wear sunscreen to prevent skin cancer.  Have a dental exam and cleaning every 6 months.  Yearly exams  See your health care team every year to talk about:  Any changes in your health.  Any medicines your care team has prescribed.  Preventive care, family planning, and ways to prevent chronic diseases.  Shots (vaccines)   HPV shots (up to age 26), if you've never had them before.  Hepatitis B shots (up to age 59), if you've never had them before.  COVID-19 shot: Get this shot when it's due.  Flu shot: Get a flu shot every year.  Tetanus shot: Get a tetanus shot every 10 years.  Pneumococcal, hepatitis A, and RSV shots: Ask your care team if you need these based on your risk.  Shingles shot (for age 50 and up)  General health tests  Diabetes screening:  Starting at age 35, Get screened for diabetes at least every 3 years.  If you are younger than age 35, ask your care team if you should be screened for diabetes.  Cholesterol test: At age 39, start having a cholesterol test every 5 years, or more often if advised.  Bone density scan (DEXA): At age 50, ask your care team if you should have this scan for osteoporosis (brittle bones).  Hepatitis C: Get tested at least once in your life.  STIs (sexually  transmitted infections)  Before age 24: Ask your care team if you should be screened for STIs.  After age 24: Get screened for STIs if you're at risk. You are at risk for STIs (including HIV) if:  You are sexually active with more than one person.  You don't use condoms every time.  You or a partner was diagnosed with a sexually transmitted infection.  If you are at risk for HIV, ask about PrEP medicine to prevent HIV.  Get tested for HIV at least once in your life, whether you are at risk for HIV or not.  Cancer screening tests  Cervical cancer screening: If you have a cervix, begin getting regular cervical cancer screening tests starting at age 21.  Breast cancer scan (mammogram): If you've ever had breasts, begin having regular mammograms starting at age 40. This is a scan to check for breast cancer.  Colon cancer screening: It is important to start screening for colon cancer at age 45.  Have a colonoscopy test every 10 years (or more often if you're at risk) Or, ask your provider about stool tests like a FIT test every year or Cologuard test every 3 years.  To learn more about your testing options, visit:   .  For help making a decision, visit:   https://bit.ly/sd54360.  Prostate cancer screening test: If you have a prostate, ask your care team if a prostate cancer screening test (PSA) at age 55 is right for you.  Lung cancer screening: If you are a current or former smoker ages 50 to 80, ask your care team if ongoing lung cancer screenings are right for you.  For informational purposes only. Not to replace the advice of your health care provider. Copyright   2023 [facility]. All rights reserved. Clinically reviewed by the [facility]. creads 063338 - REV 01/24.  Relationships for Good Health  Relationships are important for our health and happiness. Social isolation, loneliness and lack of support are bad for your health. Studies show that loneliness can harm health  and limit your life span as much as high blood pressure and smoking.   Take some time to reflect on your relationships. Then answer these questions:  Are there people in your life that cause you stress or drain your energy? What can you do to set limits?  ________________________________________________________________________________________________________________________________________________________________________________________________________________________________________________________________________________________________________________________________________________  Who do you enjoy spending time with? Who can you go to for support?  ________________________________________________________________________________________________________________________________________________________________________________________________________________________________________________________________________________________________________________________________________________  What can you do to improve your relationships with others?  __________________________________________________________________________________________________________________________________________________________________________________________________________________  ______________________________________________________________________________________________________________________________  What do you like most about your relationships with others?  ________________________________________________________________________________________________________________________________________________________________________________________________________________________________________________________________________________________________________________________________________________  My goal: ______________________________________________________________________  I will:  ______________________________________________________________________________________________________________________________________________________________________________________________    For informational purposes only. Not to replace the advice of your health care provider. Copyright   2018 [facility]. All rights reserved. Clinically reviewed by [facility]  Team. Geoff [id number] - Rev 06/24.

## 2025-05-19 NOTE — PROGRESS NOTES
Preventive Care Visit  [facility] MARY Freeman MD, Family Medicine  May 19, 2025      Assessment & Plan     Routine general medical examination at a health care facility   Overall health is good. Reviewed recent labs; 3/15/25 and 5/10/25. Will complete PCV today, defers RSV    CKD stage 3 due to type 2 diabetes mellitus (H)  - UA Macroscopic with reflex to Microscopic and Culture  - UA Macroscopic with reflex to Microscopic and Culture  - UA Microscopic with Reflex to Culture    Screen for colon cancer  - Colonoscopy Screening  Referral    Screening for prostate cancer  - PSA, screen  - PSA, screen    Type 2 diabetes mellitus with hyperglycemia, with long-term current use of insulin (H)   Refill medications  - Continuous Glucose Sensor (FREESTYLE JACK 3 PLUS SENSOR) MISC  Dispense: 6 each; Refill: 3  - semaglutide (OZEMPIC) 2 MG/3ML pen  Dispense: 3 mL; Refill: 0    Type 2 diabetes mellitus without complication, without long-term current use of insulin (H)   Needing insulin refill  - insulin glargine (LANTUS PEN) 100 UNIT/ML pen  Dispense: 15 mL; Refill: 1    ST elevation myocardial infarction (STEMI), unspecified artery (H)     -  following with cardiology    Class 2 severe obesity with serious comorbidity and body mass index (BMI) of 35.0 to 35.9 in adult, unspecified obesity type (H)    On Ozempic  - semaglutide (OZEMPIC) 2 MG/3ML pen  Dispense: 3 mL; Refill: 0      Patient has been advised of split billing requirements and indicates understanding: Yes      Counseling  Appropriate preventive services were addressed with this patient via screening, questionnaire, or discussion as appropriate for fall prevention, nutrition, physical activity, Tobacco-use cessation, social engagement, weight loss and cognition.  Checklist reviewing preventive services available has been given to the patient.  Reviewed patient's diet, addressing concerns and/or questions.   He is at risk for  lack of exercise and has been provided with information to increase physical activity for the benefit of his well-being.   Patient is at risk for social isolation and has been provided with information about the benefit of social connection.   The patient was instructed to see the dentist every 6 months.       Follow-up    Follow-up Visit   Expected date:  May 19, 2026 (Approximate)      Follow Up Appointment Details:     Follow-up with whom?: PCP    Follow-Up for what?: Adult Preventive    How?: In Person                 Neto Lamas is a 64 year old, presenting for the following:  Physical    Wt Readings from Last 4 Encounters:   05/19/25 98.4 kg (217 lb)   04/18/25 98.4 kg (217 lb)   03/19/25 95.7 kg (211 lb)   03/07/25 95.7 kg (211 lb)          5/19/2025     4:05 PM   Additional Questions   Roomed by Rosana   Accompanied by May spouse         5/19/2025   Forms   Any forms needing to be completed Yes          HPI    Diabetes Follow-up   - well controlled, has CGM, needs supply refills     BP Readings from Last 2 Encounters:   05/19/25 124/86   04/18/25 (!) 132/96     Hemoglobin A1C (%)   Date Value   05/10/2025 6.0 (H)   03/15/2025 9.8 (H)   02/25/2012 5.7     LDL Cholesterol Calculated   Date Value   03/15/2025 51 mg/dL   02/11/2025      Comment:     Cannot estimate LDL when triglyceride exceeds 400 mg/dL   04/13/2018     Cannot estimate LDL when triglyceride exceeds 400 mg/dL mg/dL   02/25/2012 160 mg/dL (H)     LDL Cholesterol Direct (mg/dL)   Date Value   02/11/2025 109 (H)   04/13/2018 99     CAD:  Recent STEMI    Following with cardiology.    Chronic Kidney Disease Follow-up  {Provider   Do you take any over the counter pain medicine?: No  Advance Care Planning    Discussed advance care planning with patient; however, patient declined at this time.        5/19/2025   General Health   How would you rate your overall physical health? Good   Feel stress (tense, anxious, or unable to sleep) Only a little    (!) STRESS CONCERN      5/19/2025   Nutrition   Three or more servings of calcium each day? Yes   Diet: Low salt    Diabetic    Breakfast skipped   How many servings of fruit and vegetables per day? (!) 0-1   How many sweetened beverages each day? (!) I DON'T KNOW       Multiple values from one day are sorted in reverse-chronological order         5/19/2025   Exercise   Days per week of moderate/strenous exercise 3 days         5/19/2025   Social Factors   Frequency of gathering with friends or relatives Never   Worry food won't last until get money to buy more No   Food not last or not have enough money for food? No   Do you have housing? (Housing is defined as stable permanent housing and does not include staying outside in a car, in a tent, in an abandoned building, in an overnight shelter, or couch-surfing.) Yes   Are you worried about losing your housing? No   Lack of transportation? Yes   Unable to get utilities (heat,electricity)? No    (!) TRANSPORTATION CONCERN PRESENT(!) SOCIAL CONNECTIONS CONCERN      5/19/2025   Fall Risk   Fallen 2 or more times in the past year? No   Trouble with walking or balance? No          5/19/2025   Dental   Dentist two times every year? (!) NO; not any more       Today's PHQ-2 Score:       2/11/2025     3:45 PM   PHQ-2 ( 1999 [employer])   Q1: Little interest or pleasure in doing things 1   Q2: Feeling down, depressed or hopeless 0   PHQ-2 Score 1    Q1: Little interest or pleasure in doing things Several days   Q2: Feeling down, depressed or hopeless Not at all   PHQ-2 Score 1       Patient-reported         5/19/2025   Substance Use   Alcohol more than 3/day or more than 7/wk No   Do you use any other substances recreationally? No     Social History     Tobacco Use    Smoking status: Never     Passive exposure: Never    Smokeless tobacco: Never   Substance Use Topics    Alcohol use: Yes     Comment: occasional, social    Drug use: No           5/19/2025   STI Screening   New  sexual partner(s) since last STI/HIV test? No   Last PSA:   PSA   Date Value Ref Range Status   2018 0.46 0 - 4 ug/L Final     Comment:     Assay Method:  Chemiluminescence using Siemens Vista analyzer     Prostate Specific Antigen Screen   Date Value Ref Range Status   2025 0.40 0.00 - 4.50 ng/mL Final     ASCVD Risk   The ASCVD Risk score (Cheryle PRESTON, et al., 2019) failed to calculate for the following reasons:    Risk score cannot be calculated because patient has a medical history suggesting prior/existing ASCVD    Fracture Risk Assessment Tool  Link to Frax Calculator  Use the information below to complete the Frax calculator  : 1960  Sex: male  Weight (kg): 98.4 kg (actual weight)  Height (cm): 166 cm  Previous Fragility Fracture:  No  History of parent with fractured hip:  No  Current Smoking:  No  Patient has been on glucocorticoids for more than 3 months (5mg/day or more): No  Rheumatoid Arthritis on Problem List:  No  Secondary Osteoporosis on Problem List:  No  Consumes 3 or more units of alcohol per day: No  Femoral Neck BMD (g/cm2)    Reviewed and updated as needed this visit by Provider                    Patient Active Problem List   Diagnosis    Idiopathic chronic gout of multiple sites without tophus    Essential hypertension with goal blood pressure less than 140/90    Class 2 severe obesity due to excess calories with serious comorbidity and body mass index (BMI) of 35.0 to 35.9 in adult (H)    Type 2 diabetes mellitus without complication, without long-term current use of insulin (H)    Hyperlipidemia LDL goal <70    Type 2 diabetes mellitus with hyperglycemia, with long-term current use of insulin (H)    CKD stage 3 due to type 2 diabetes mellitus (H)    ST elevation myocardial infarction (STEMI), unspecified artery (H)    Coronary artery disease involving coronary bypass graft of native heart without angina pectoris    Right hand weakness    Pain of left lower leg     "Bradycardia     Past Surgical History:   Procedure Laterality Date    COLONOSCOPY WITH CO2 INSUFFLATION N/A 5/11/2018    Procedure: COLONOSCOPY WITH CO2 INSUFFLATION;  Dr. Winn/Periumbilical pain-screening/BMI: 36.15-[facility] [place] [phone];  Surgeon: Duane, William Charles, MD;  Location: [facility] OR    COMBINED ESOPHAGOSCOPY, GASTROSCOPY, DUODENOSCOPY (EGD) WITH CO2 INSUFFLATION N/A 5/11/2018    Procedure: COMBINED ESOPHAGOSCOPY, GASTROSCOPY, DUODENOSCOPY (EGD) WITH CO2 INSUFFLATION;  Dr. Winn/Periumbilical pain-screening/BMI: 36.15-[facility] [place] [phone];  Surgeon: Duane, William Charles, MD;  Location:  OR    NO HISTORY OF SURGERY         Social History     Tobacco Use    Smoking status: Never     Passive exposure: Never    Smokeless tobacco: Never   Substance Use Topics    Alcohol use: Yes     Comment: occasional, social     Family History   Problem Relation Age of Onset    Diabetes Mother     Hypertension No family hx of     Coronary Artery Disease No family hx of     Cerebrovascular Disease No family hx of     Cancer No family hx of              Review of Systems  Constitutional, neuro, ENT, endocrine, pulmonary, cardiac, gastrointestinal, genitourinary, musculoskeletal, integument and psychiatric systems are negative, except as otherwise noted.     Objective    Exam  /86   Pulse 60   Temp 97  F (36.1  C) (Temporal)   Resp 16   Ht 1.66 m (5' 5.35\")   Wt 98.4 kg (217 lb)   SpO2 95%   BMI 35.72 kg/m     Estimated body mass index is 35.72 kg/m  as calculated from the following:    Height as of this encounter: 1.66 m (5' 5.35\").    Weight as of this encounter: 98.4 kg (217 lb).    Physical Exam  GENERAL: alert and no distress  EYES: Eyes grossly normal to inspection.  HENT: ear canals and TM's normal, nose and mouth without ulcers or lesions  NECK: no adenopathy, no asymmetry, masses, or scars  RESP: lungs clear to auscultation - no rales, rhonchi or wheezes  CV: " regular rate and rhythm, no murmur, click or rub, no peripheral edema  ABDOMEN: soft, nontender, no hepatosplenomegaly, no masses and bowel sounds normal  MS: no gross musculoskeletal defects noted, no edema  SKIN: no suspicious lesions or rashes  NEURO: Normal strength and tone, mentation intact and speech normal  PSYCH: mentation appears normal, affect normal/bright        Signed Electronically by: Jagdeep Freeman MD

## 2025-05-19 NOTE — PROGRESS NOTES
Prior to immunization administration, verified patients identity using patient s name and date of birth. Please see Immunization Activity for additional information.     Screening Questionnaire for Adult Immunization    Are you sick today?   No   Do you have allergies to medications, food, a vaccine component or latex?   No   Have you ever had a serious reaction after receiving a vaccination?   No   Do you have a long-term health problem with heart, lung, kidney, or metabolic disease (e.g., diabetes), asthma, a blood disorder, no spleen, complement component deficiency, a cochlear implant, or a spinal fluid leak?  Are you on long-term aspirin therapy?   No   Do you have cancer, leukemia, HIV/AIDS, or any other immune system problem?   No   Do you have a parent, brother, or sister with an immune system problem?   No   In the past 3 months, have you taken medications that affect  your immune system, such as prednisone, other steroids, or anticancer drugs; drugs for the treatment of rheumatoid arthritis, Crohn s disease, or psoriasis; or have you had radiation treatments?   No   Have you had a seizure, or a brain or other nervous system problem?   No   During the past year, have you received a transfusion of blood or blood    products, or been given immune (gamma) globulin or antiviral drug?   No   For women: Are you pregnant or is there a chance you could become       pregnant during the next month?   No   Have you received any vaccinations in the past 4 weeks?   No     Immunization questionnaire answers were all negative.      Patient instructed to remain in clinic for 15 minutes afterwards, and to report any adverse reactions.     Screening performed by Rosana Barrientos MA on 5/19/2025 at 4:54 PM.

## 2025-05-20 LAB — PSA SERPL DL<=0.01 NG/ML-MCNC: 0.4 NG/ML (ref 0–4.5)

## 2025-05-21 NOTE — TELEPHONE ENCOUNTER
PA Initiation    Medication: FREESTYLE JACK 3 PLUS SENSOR MISC  Insurance Company: [employer] - Phone [phone] Fax [phone]  Pharmacy Filling the Rx: [facility] [address] [facility]  Filling Pharmacy Phone: [phone]  Filling Pharmacy Fax:    Start Date: 5/21/2025

## 2025-05-22 RX ORDER — ACYCLOVIR 400 MG/1
1 TABLET ORAL
Qty: 9 EACH | Refills: 5 | Status: SHIPPED | OUTPATIENT
Start: 2025-05-22

## 2025-05-22 RX ORDER — ACYCLOVIR 400 MG/1
1 TABLET ORAL ONCE
Qty: 1 EACH | Refills: 0 | Status: SHIPPED | OUTPATIENT
Start: 2025-05-22 | End: 2025-05-22

## 2025-05-22 NOTE — TELEPHONE ENCOUNTER
Called and informed pt that device had to be switched due to insurance and to contact pharmacy for .  Sofia Zavala, CMA

## 2025-05-22 NOTE — TELEPHONE ENCOUNTER
PRIOR AUTHORIZATION DENIED    Medication: FREESTYLE JACK 3 PLUS SENSOR MISC  Insurance Company: [employer] - Phone [phone] Fax [phone]  Denial Date: 5/21/2025  Denial Reason(s): NON PREFERRED PRODUCT - DEXCOM IS PREFERRED ON PLAN  Appeal Information: SEE ATTACHED  Patient Notified: NO

## 2025-05-22 NOTE — TELEPHONE ENCOUNTER
Did an order for Dexcom which is the preferred product per patients insurance, please let the patient know

## 2025-05-23 ENCOUNTER — RESULTS FOLLOW-UP (OUTPATIENT)
Dept: FAMILY MEDICINE | Facility: CLINIC | Age: 65
End: 2025-05-23

## 2025-05-28 LAB — CV ZIO PRELIM RESULTS: NORMAL

## 2025-05-29 ENCOUNTER — RESULTS FOLLOW-UP (OUTPATIENT)
Dept: CARDIOLOGY | Facility: CLINIC | Age: 65
End: 2025-05-29

## 2025-05-29 DIAGNOSIS — I49.1 PAC (PREMATURE ATRIAL CONTRACTION): Primary | ICD-10-CM

## 2025-06-02 ENCOUNTER — PATIENT OUTREACH (OUTPATIENT)
Dept: CARE COORDINATION | Facility: CLINIC | Age: 65
End: 2025-06-02
Payer: COMMERCIAL

## 2025-06-04 ENCOUNTER — MYC MEDICAL ADVICE (OUTPATIENT)
Dept: CARDIOLOGY | Facility: CLINIC | Age: 65
End: 2025-06-04
Payer: COMMERCIAL

## 2025-06-04 ENCOUNTER — MYC MEDICAL ADVICE (OUTPATIENT)
Dept: FAMILY MEDICINE | Facility: CLINIC | Age: 65
End: 2025-06-04
Payer: COMMERCIAL

## 2025-06-04 DIAGNOSIS — R10.13 ABDOMINAL PAIN, EPIGASTRIC: Primary | ICD-10-CM

## 2025-06-04 DIAGNOSIS — M10.9 GOUT FLARE: ICD-10-CM

## 2025-06-04 RX ORDER — PANTOPRAZOLE SODIUM 40 MG/1
40 TABLET, DELAYED RELEASE ORAL DAILY
Qty: 30 TABLET | Refills: 1 | Status: SHIPPED | OUTPATIENT
Start: 2025-06-04

## 2025-06-04 NOTE — TELEPHONE ENCOUNTER
"Situation  Patient is currently experiencing a gout flare and will likely be started on prednisone. Are you okay ordering pantoprazole for GI protection while on Eliquis ?      Date: 6/4/2025    Time of Call: 3:21 PM     Diagnosis:  GI protection     [ TORB ] Ordering provider: Dr Rush   Order: \" Yes, let's do pantoprazole 40mg every day while taking the prednisone    Order received by: Dr Rush     Follow-up/additional notes: Patient notified via [employer].  New prescription sent to preferred pharmacy.          "

## 2025-06-08 PROBLEM — E11.65 TYPE 2 DIABETES MELLITUS WITH HYPERGLYCEMIA, WITH LONG-TERM CURRENT USE OF INSULIN (H): Status: RESOLVED | Noted: 2025-02-28 | Resolved: 2025-06-08

## 2025-06-08 PROBLEM — Z79.4 TYPE 2 DIABETES MELLITUS WITH HYPERGLYCEMIA, WITH LONG-TERM CURRENT USE OF INSULIN (H): Status: RESOLVED | Noted: 2025-02-28 | Resolved: 2025-06-08

## 2025-06-08 PROBLEM — R00.1 BRADYCARDIA: Status: RESOLVED | Noted: 2025-03-07 | Resolved: 2025-06-08

## 2025-06-08 PROBLEM — M79.662 PAIN OF LEFT LOWER LEG: Status: RESOLVED | Noted: 2025-02-28 | Resolved: 2025-06-08

## 2025-06-08 PROBLEM — R29.898 RIGHT HAND WEAKNESS: Status: RESOLVED | Noted: 2025-02-28 | Resolved: 2025-06-08

## 2025-06-08 PROBLEM — I21.3 ST ELEVATION MYOCARDIAL INFARCTION (STEMI), UNSPECIFIED ARTERY (H): Status: RESOLVED | Noted: 2025-02-28 | Resolved: 2025-06-08

## 2025-06-09 DIAGNOSIS — I50.22 CHRONIC SYSTOLIC HEART FAILURE (H): Primary | ICD-10-CM

## 2025-06-17 ENCOUNTER — TELEPHONE (OUTPATIENT)
Dept: GASTROENTEROLOGY | Facility: CLINIC | Age: 65
End: 2025-06-17

## 2025-06-17 NOTE — TELEPHONE ENCOUNTER
"Pre Assessment RN Review    Focused Assessments    Cardiac Review      Has the patient had a stent placed in the last year?  No. Patient has hx of hospitalization for a Heart attack (STEMI) on 2-18-25 and was recently advised 4-23-25  at to have a sleep study and an ECHO d/t an abormal ZIO patch.      \"Plan:     1.  Sleep study in order to rule out significant sleep-related breathing disorder as an underlying cause for frequent PACs seen more prominently at night during sleeping hours     2.  Echocardiogram to rule out significant structural pathology as cause for symptoms     3.  Continue cardiac rehab>> \" Arnav Rush MD     Since this is a screening, he should get the sleep study and echo complete first as those results may change where he can go and what sedation    Scheduling Status & Recommendations    Delay scheduling pt should completed above orders as results can impact setting and sedation required.     Naz Rodriguez, RN  Endoscopy Procedure Pre Assessment RN    "

## 2025-06-17 NOTE — TELEPHONE ENCOUNTER
"Endoscopy Scheduling Screen    Caller: patient    Have you had any respiratory illness or flu-like symptoms in the last 10 days?  No    What is your communication preference for Instructions and/or Bowel Prep?   [place]    What insurance is in the chart?  Other:  BP    Ordering/Referring Provider: JARRELL GRANGER    (If ordering provider performs procedure, schedule with ordering provider unless otherwise instructed. )    BMI: Estimated body mass index is 35.72 kg/m  as calculated from the following:    Height as of 5/19/25: 1.66 m (5' 5.35\").    Weight as of 5/19/25: 98.4 kg (217 lb).     Sedation Ordered  moderate sedation.   If patient BMI > 50 do not schedule in [facility].    If patient BMI > 45 do not schedule at [facility].    Are you taking methadone or Suboxone?  NO, No RN review required.    Have you been diagnosed and are being treated for severe PTSD or severe anxiety?  NO, No RN review required.    Are you taking any prescription medications for pain 3 or more times per week?   NO, No RN review required.    Do you have a history of malignant hyperthermia?  No    (Females) Are you currently pregnant?        Have you been diagnosed or told you have pulmonary hypertension?   No    Do you have an LVAD?  No    Have you been told you have moderate to severe sleep apnea?  No.    Have you been told you have COPD, asthma, or any other lung disease?  No    Has your doctor ordered any cardiac tests like echo, angiogram, stress test, ablation, or EKG, that you have not completed yet?  No    Do you  have a history of any heart conditions?  Yes     Have you had any hospitalizations  in the last year for heart related issues, for example a stent placement, heart attack, or cardiomyopathy?  Yes (RN Review required for scheduling.)    Do you have any implantable devices in your body (pacemaker, ICD)?  No    Do you take nitroglycerine?  No    Have you ever had or are you waiting for an organ transplant?  No. Continue " "scheduling, no site restrictions.    Have you had a stroke or transient ischemic attack (TIA aka \"mini stroke\") in the last 2 years?   No.    Have you been diagnosed with or been told you have cirrhosis of the liver?   No.    Are you currently on dialysis?   No    Do you need assistance transferring?   No    BMI: Estimated body mass index is 35.72 kg/m  as calculated from the following:    Height as of 5/19/25: 1.66 m (5' 5.35\").    Weight as of 5/19/25: 98.4 kg (217 lb).     Is patients BMI > 40 and scheduling location [facility]?  No    Do you take an injectable or oral medication for weight loss or diabetes (excluding insulin)?  No    Do you take the medication Naltrexone?  No    Do you take blood thinners?  Yes, you must contact your prescribing provider for directions on holding or bridging with a different medication.       Prep   Are you currently on dialysis or do you have chronic kidney disease?  Yes ([facility] Prep)    Do you have a diagnosis of diabetes?  Yes (Golytely Prep)    Do you have a diagnosis of cystic fibrosis (CF)?  No    On a regular basis do you go 3 -5 days between bowel movements?  No    BMI > 40?  No    Preferred Pharmacy:    [facility] [place] [id number]  Phone: [phone] Fax: [phone]    Final Scheduling Details     Procedure scheduled  Colonoscopy    Surgeon:       Date of procedure: needs echo 1st per RN- patients daughter will call back        Pre-OP / PAC:       Location       Sedation          Patient Reminders:   You will receive a call from a Nurse to review instructions and health history.  This assessment must be completed prior to your procedure.  Failure to complete the Nurse assessment may result in the procedure being cancelled.      On the day of your procedure, please designate an adult(s) who can drive you home stay with you for the next 24 hours. The medicines used in the exam will make " you sleepy. You will not be able to drive.      You cannot take public transportation, ride share services, or non-medical taxi service without a responsible caregiver.  Medical transport services are allowed with the requirement that a responsible caregiver will receive you at your destination.  We require that drivers and caregivers are confirmed prior to your procedure.

## 2025-06-18 ENCOUNTER — OFFICE VISIT (OUTPATIENT)
Dept: CARDIOLOGY | Facility: CLINIC | Age: 65
End: 2025-06-18
Payer: COMMERCIAL

## 2025-06-18 ENCOUNTER — TELEPHONE (OUTPATIENT)
Dept: CARDIOLOGY | Facility: CLINIC | Age: 65
End: 2025-06-18

## 2025-06-18 ENCOUNTER — LAB (OUTPATIENT)
Dept: LAB | Facility: CLINIC | Age: 65
End: 2025-06-18
Payer: COMMERCIAL

## 2025-06-18 VITALS
HEIGHT: 65 IN | BODY MASS INDEX: 36.35 KG/M2 | OXYGEN SATURATION: 96 % | HEART RATE: 77 BPM | SYSTOLIC BLOOD PRESSURE: 108 MMHG | WEIGHT: 218.2 LBS | DIASTOLIC BLOOD PRESSURE: 69 MMHG

## 2025-06-18 DIAGNOSIS — E78.5 HYPERLIPIDEMIA LDL GOAL <70: ICD-10-CM

## 2025-06-18 DIAGNOSIS — I25.5 ISCHEMIC CARDIOMYOPATHY: ICD-10-CM

## 2025-06-18 DIAGNOSIS — E11.9 DIABETES MELLITUS TYPE 2, NONINSULIN DEPENDENT (H): ICD-10-CM

## 2025-06-18 DIAGNOSIS — I50.22 CHRONIC SYSTOLIC HEART FAILURE (H): Primary | ICD-10-CM

## 2025-06-18 DIAGNOSIS — I10 ESSENTIAL HYPERTENSION WITH GOAL BLOOD PRESSURE LESS THAN 140/90: ICD-10-CM

## 2025-06-18 DIAGNOSIS — I50.22 CHRONIC SYSTOLIC HEART FAILURE (H): ICD-10-CM

## 2025-06-18 LAB
ANION GAP SERPL CALCULATED.3IONS-SCNC: 13 MMOL/L (ref 7–15)
BUN SERPL-MCNC: 25.2 MG/DL (ref 8–23)
CALCIUM SERPL-MCNC: 9.7 MG/DL (ref 8.8–10.4)
CHLORIDE SERPL-SCNC: 108 MMOL/L (ref 98–107)
CREAT SERPL-MCNC: 1.57 MG/DL (ref 0.67–1.17)
EGFRCR SERPLBLD CKD-EPI 2021: 49 ML/MIN/1.73M2
GLUCOSE SERPL-MCNC: 142 MG/DL (ref 70–99)
HCO3 SERPL-SCNC: 21 MMOL/L (ref 22–29)
NT-PROBNP SERPL-MCNC: 775 PG/ML (ref 0–177)
POTASSIUM SERPL-SCNC: 3.9 MMOL/L (ref 3.4–5.3)
SODIUM SERPL-SCNC: 142 MMOL/L (ref 135–145)

## 2025-06-18 PROCEDURE — 80048 BASIC METABOLIC PNL TOTAL CA: CPT

## 2025-06-18 PROCEDURE — 36415 COLL VENOUS BLD VENIPUNCTURE: CPT

## 2025-06-18 PROCEDURE — 83880 ASSAY OF NATRIURETIC PEPTIDE: CPT

## 2025-06-18 RX ORDER — SACUBITRIL AND VALSARTAN 24; 26 MG/1; MG/1
0.5 TABLET, FILM COATED ORAL 2 TIMES DAILY
Qty: 90 TABLET | Refills: 3 | Status: SHIPPED | OUTPATIENT
Start: 2025-06-18

## 2025-06-18 ASSESSMENT — PAIN SCALES - GENERAL: PAINLEVEL_OUTOF10: NO PAIN (0)

## 2025-06-18 NOTE — PATIENT INSTRUCTIONS
Take your medicines every day, as directed     Changes made today:    - Start Entresto 12/13 mg BID     Monitor Your Weight and Symptoms     Contact us if you:     Gain 2 pounds in one day or 5 pounds in one week  Feel more short of breath  Notice more leg swelling  Feel lightheadeded    Change your lifestyle     Limit Salt or Sodium:  2000 mg  Limit Fluids:  2000 mL or approximately 64 ounces  Eat a Heart Healthy Diet  Low in saturated fats  Stay Active:  Aim to move at least 150 minutes every  week            To Contact us     During Business Hours:  [phone], option # 1      After hours, weekends or holidays:   [phone], Option #4  Ask to speak to the On-Call Cardiologist. Inform them you are a CORE/heart failure patient at the [facility].       Use [employer] allows you to communicate directly with your heart team through secure messaging.  [employer] can be accessed any time on your phone, computer, or tablet.  If you need assistance, we'd be happy to help!             Keep your Heart Appointments:     - Labs in 2 weeks (July 2nd before your echo)    - CORE follow up on July 30th      Please consider attending our virtual support group which is held monthly. Please reach out to Jamel at [phone] for more information if you are interested in attending.

## 2025-06-18 NOTE — PROGRESS NOTES
"  HPI: Cydney is a 65 year old  male with a past medical history of :  1.  Type 2 diabetes  2.  Hypertension  3.  Hyperlipidemia  4.  Chronic renal sufficiency with a current baseline GFR of 49 mL/min as of 2/26/2025  5.  Obesity  6.  Gout    He has completed the rehab.  He said it went well.  He is here with his wife and daughter was on the phone No SOB at rest.  No SANTANA. He has some swelling on the left leg which has been there. Weight at home 214-215 lbs.  He has no other concerns.     Denies SOB, SANTANA, PND, orthopnea, edema, weight gain, chest pain, palpitations, lightheadedness, dizziness, near syncopal/syncopal episodes. Cydney has been following salt and fluid restrictions.        EXAM:  /69 (BP Location: Right arm, Patient Position: Sitting, Cuff Size: Adult Large)   Pulse 77   Ht 1.66 m (5' 5.35\")   Wt 99 kg (218 lb 3.2 oz)   SpO2 96%   BMI 35.92 kg/m    Home weight:  GENERAL: Appears comfortable, in no acute distress.   HEENT: Eye symmetrical, no discharge or icterus bilaterally.   CV: RRR, +S1S2, no murmur, rub, or gallop. JVP < 6 .   RESPIRATORY: Respirations regular, even, and unlabored. Lungs CTA throughout.  GI: Soft and non distended  EXTREMITIES: No peripheral edema. Extremities warm and well perfused.   NEUROLOGIC: Alert and interacting appropriately. No focal deficits.   MUSCULOSKELETAL: No joint swelling or tenderness.   SKIN: No jaundice.      Labs:  CBC RESULTS:  Lab Results   Component Value Date    WBC 8.7 03/15/2025    WBC 8.1 01/27/2021    RBC 3.74 (L) 03/15/2025    RBC 4.56 01/27/2021    HGB 11.2 (L) 03/15/2025    HGB 13.8 01/27/2021    HCT 35.0 (L) 03/15/2025    HCT 40.0 01/27/2021    MCV 94 03/15/2025    MCV 88 01/27/2021    MCH 29.9 03/15/2025    MCH 30.3 01/27/2021    MCHC 32.0 03/15/2025    MCHC 34.5 01/27/2021    RDW 14.0 03/15/2025    RDW 11.7 01/27/2021     (H) 03/15/2025     01/27/2021       CMP RESULTS:  Lab Results   Component Value Date     " "06/18/2025     01/27/2021    POTASSIUM 3.9 06/18/2025    POTASSIUM 3.9 01/27/2021    CHLORIDE 108 (H) 06/18/2025    CHLORIDE 113 (H) 01/27/2021    CO2 21 (L) 06/18/2025    CO2 24 01/27/2021    ANIONGAP 13 06/18/2025    ANIONGAP 6 01/27/2021     (H) 06/18/2025    GLC 96 01/27/2021    BUN 25.2 (H) 06/18/2025    BUN 30 01/27/2021    CR 1.57 (H) 06/18/2025    CR 1.70 (H) 01/27/2021    GFRESTIMATED 49 (L) 06/18/2025    GFRESTIMATED 43 (L) 01/27/2021    GFRESTBLACK 50 (L) 01/27/2021    KRISHNA 9.7 06/18/2025    KRISHNA 9.1 01/27/2021    BILITOTAL 0.6 03/15/2025    BILITOTAL 0.3 01/27/2021    ALBUMIN 3.7 03/15/2025    ALBUMIN 3.6 01/27/2021    ALKPHOS 128 03/15/2025    ALKPHOS 105 01/27/2021    ALT 53 03/15/2025    ALT 69 01/27/2021    AST 28 03/15/2025     (H) 01/27/2021        INR RESULTS:  No results found for: \"INR\"    No components found for: \"CK\"  No results found for: \"MAG\"  No results found for: \"NTBNP\"  @BRIEFLABR (dig)@    Most recent echocardiogram:  [facility] 2/25  nterpretation Summary     * Technically difficult study.     * The left ventricular systolic function is mildly decreased, estimated LVEF   40-45%.    * Left ventricular wall motion is abnormal with inferior and lateral   hypokinesis, and apical hypokinesis/akinesis.     * The right ventricle is normal size with quantitatively reduced right   ventricular systolic function.     * There is mild mitral regurgitation.     * The inferior vena cava is normal in size (< 2.1 cm), > 50% respiratory   variance, RA pressure normal at 3 mmHg.     * No prior study.       Assessment and Plan:    Cydney is a 64-year-old gentleman whose past medical history significant for chronic renal insufficiency with a baseline GFR 49 mL/min as of 2/26/2025, along with gout who has several active cardiac issues:     1.  Mild to moderate ischemic cardiomyopathy     The patient experienced an ST elevation myocardial infarction on 2/18/2025 secondary to an acutely thrombotic " "dominant circumflex stenosis that did not respond well to aspiration thrombectomy and PTCA.  Four-vessel coronary bypass surgery was performed on 2/20/2025 (LIMA to LAD, vein graft to diagonal, vein graft to OM, vein graft to left SHIVA)     Ejection fraction estimated 40 to 45% with an inferolateral wall motion normality and mild MR by echo 2/18/2025.     From a clinical standpoint, since being discharged from the hospital, he has done adequately and has not had recurrent anginal symptoms.  He seems to be fairly well compensated from a fluid balance standpoint at present, however he will need optimization of his CHF regimen in the future going forward.     2.  Hypertension     Goal systolic blood pressure 115 mmHg without gross side effects or symptoms, blood pressures currently running 108 in clinic mmHg systolic.     3.  Hyperlipidemia     Recently started Crestor 40 mg a day, continue to follow, goal LDL less than 70 or preferably lower.    4.   DVT - \"Upper extremity venous ultrasound 4/2/2025 confirms a high-grade deep venous thrombosis of the right internal jugular vein, incidentally noted in the context of workup for left elbow pain.\"  Taking Apixaban. Will consult with Dr. Rush if they should return to [facility] and stop Apixaban. The family stated the plan was for 3 months on the Apixaban . Patient denies any symptoms - such as pain or swelling.    BMP reviewed as well as testing.     1.  Chronic systolic heart failure secondary ICM .  Stage B  NYHA Class II  ACEi/ARB: yes- will start Entresto 12/13 mg BID - for GDMT with HFrEF  BB: yes  Aldosterone antagonist: no  SGLT2i- not started yet  SCD prophylaxis: does not meet criteria for implant  Fluid status: euvolemic  Anticoagulation: Eliquis   Sleep apnea: needs to see sleep medicine  NSAID use:  Contraindicated.  Avoid use.  Remote Monitoring:none  GLP-1 - Ozempic- improved A1C 6.0    2.  Follow-up   Start Entresto 12/13 mg BID   BMP in 2 weeks -  [facility] " 7/31      Ken SHAHID, CNP  [facility]

## 2025-06-18 NOTE — NURSING NOTE
"Chief Complaint   Patient presents with    New Patient     [facility] Enrollment, 66 yo male with systolic heart failure presents for initial visit with labs prior.       Initial /69 (BP Location: Right arm, Patient Position: Sitting, Cuff Size: Adult Large)   Pulse 77   Ht 1.66 m (5' 5.35\")   Wt 99 kg (218 lb 3.2 oz)   SpO2 96%   BMI 35.92 kg/m   Estimated body mass index is 35.92 kg/m  as calculated from the following:    Height as of this encounter: 1.66 m (5' 5.35\").    Weight as of this encounter: 99 kg (218 lb 3.2 oz).  BP completed using cuff size: large    Medication refills needed?: metoprolol, rosuvastatin      Joanna Hudson, EMT  "

## 2025-06-18 NOTE — NURSING NOTE
Labs: Patient was given results of the laboratory testing obtained today. Patient was instructed to return for the next laboratory testing in 2 weeks. Patient demonstrated understanding of this information and agreed to call with further questions or concerns.     Med Reconcile: Reviewed and verified all current medications with the patient. The updated medication list was printed and given to the patient.    Return Appointment: Patient given instructions regarding scheduling next clinic visit. Patient demonstrated understanding of this information and agreed to call with further questions or concerns. CORE in 6 weeks.    Medication Change: Patient was educated regarding prescribed medication change, including discussion of the indication, administration, side effects, and when to report to MD or RN. Patient demonstrated understanding of this information and agreed to call with further questions or concerns. Start Entresto 12-13 mg BID.    Patient stated he understood all health information given and agreed to call with further questions or concerns.    Ester Castillo, RN

## 2025-06-19 NOTE — TELEPHONE ENCOUNTER
PRIOR AUTHORIZATION DENIED    Medication: SACUBITRIL-VALSARTAN 24-26 MG PO TABS   Insurance Company: [employer] - Phone [phone] Fax [phone]  Denial Date: 6/18/2025  Denial Reason(s): Must try/fail preferred covered medication       Appeal Information:         Patient Notified: No

## 2025-06-20 NOTE — TELEPHONE ENCOUNTER
Medication Appeal Initiation    Medication: SACUBITRIL-VALSARTAN 24-26 MG PO TABS  Appeal Start Date:  6/20/2025  Insurance Company: [employer] Mobile Phone: [phone]  Insurance Fax: [phone]  Comments:     JAVIER faxed

## 2025-06-20 NOTE — TELEPHONE ENCOUNTER
MEDICATION APPEAL APPROVED    Medication: SACUBITRIL-VALSARTAN 24-26 MG PO TABS  Authorization Effective Date: 6/20/2025  Authorization Expiration Date: 6/20/2026  Approved Dose/Quantity: NA  Reference #:     Appeal Insurance Company: [employer]  Expected CoPay: $       CoPay Card Available:    Financial Assistance Needed: DARCY  Filling Pharmacy:  [facility]  Patient Notified: Yes via mychart  Comments:

## 2025-06-23 ENCOUNTER — OFFICE VISIT (OUTPATIENT)
Dept: FAMILY MEDICINE | Facility: CLINIC | Age: 65
End: 2025-06-23
Payer: COMMERCIAL

## 2025-06-23 VITALS
HEART RATE: 78 BPM | OXYGEN SATURATION: 98 % | TEMPERATURE: 97.9 F | WEIGHT: 215 LBS | RESPIRATION RATE: 16 BRPM | HEIGHT: 65 IN | SYSTOLIC BLOOD PRESSURE: 131 MMHG | BODY MASS INDEX: 35.82 KG/M2 | DIASTOLIC BLOOD PRESSURE: 79 MMHG

## 2025-06-23 DIAGNOSIS — I25.10 CORONARY ARTERY DISEASE INVOLVING NATIVE HEART WITHOUT ANGINA PECTORIS, UNSPECIFIED VESSEL OR LESION TYPE: ICD-10-CM

## 2025-06-23 DIAGNOSIS — N52.9 ERECTILE DYSFUNCTION, UNSPECIFIED ERECTILE DYSFUNCTION TYPE: ICD-10-CM

## 2025-06-23 DIAGNOSIS — Z12.11 SCREEN FOR COLON CANCER: ICD-10-CM

## 2025-06-23 DIAGNOSIS — E11.9 TYPE 2 DIABETES MELLITUS WITHOUT COMPLICATION, WITHOUT LONG-TERM CURRENT USE OF INSULIN (H): Primary | ICD-10-CM

## 2025-06-23 PROCEDURE — 99213 OFFICE O/P EST LOW 20 MIN: CPT | Performed by: FAMILY MEDICINE

## 2025-06-23 NOTE — PROGRESS NOTES
"  Assessment & Plan     Type 2 diabetes mellitus without complication, without long-term current use of insulin (H)   Well controlled on Ozempic, last A1c on 5/10/2025 was 6.0. Refill Ozempic  - semaglutide (OZEMPIC) 2 MG/3ML pen  Dispense: 3 mL; Refill: 0    Coronary artery disease involving native heart without angina pectoris, unspecified vessel or lesion type    - Hx STEMI, s/p CABG x 4 on 2/20/25; following with cardiology    Erectile dysfunction, unspecified erectile dysfunction type  - having some ED concerns; could be related to medical conditions and medication, exploring if can do a phosphodiesterase-5 enzyme inhibitor; discussed talking to cardiology about this.    Screen for colon cancer  - Colonoscopy Screening  Referral      Follow-up       Neto Lamas is a 65 year old, presenting for the following health issues:  Recheck Medication    DVT: 4/2/25: for 3 mo on apixaban    Ozempic    History of Present Illness       Reason for visit:  Medication and follow up    He eats 2-3 servings of fruits and vegetables daily.He consumes 0 sweetened beverage(s) daily.He exercises with enough effort to increase his heart rate 20 to 29 minutes per day.  He exercises with enough effort to increase his heart rate 7 days per week.   He is taking medications regularly.          Review of Systems  Constitutional, HEENT, cardiovascular, pulmonary, gi and gu systems are negative, except as otherwise noted.      Objective    /79   Pulse 78   Temp 97.9  F (36.6  C) (Temporal)   Resp 16   Ht 1.66 m (5' 5.35\")   Wt 97.5 kg (215 lb)   SpO2 98%   BMI 35.39 kg/m    Body mass index is 35.39 kg/m .  Physical Exam   GENERAL: alert and no distress  NECK: no adenopathy, no asymmetry, masses, or scars  RESP: lungs clear to auscultation - no rales, rhonchi or wheezes  CV: regular rate and rhythm, no murmur, click or rub, no peripheral edema  ABDOMEN: soft, nontender, no hepatosplenomegaly, no masses and bowel " sounds normal  MS: no gross musculoskeletal defects noted, no edema          Signed Electronically by: Jagdeep Freeman MD

## 2025-06-30 ENCOUNTER — MYC REFILL (OUTPATIENT)
Dept: FAMILY MEDICINE | Facility: CLINIC | Age: 65
End: 2025-06-30
Payer: COMMERCIAL

## 2025-06-30 DIAGNOSIS — I25.10 CORONARY ARTERY DISEASE INVOLVING NATIVE HEART WITHOUT ANGINA PECTORIS, UNSPECIFIED VESSEL OR LESION TYPE: Primary | ICD-10-CM

## 2025-06-30 DIAGNOSIS — E78.5 HYPERLIPIDEMIA LDL GOAL <100: ICD-10-CM

## 2025-07-01 RX ORDER — METOPROLOL SUCCINATE 25 MG/1
12.5 TABLET, EXTENDED RELEASE ORAL DAILY
Qty: 45 TABLET | Refills: 1 | Status: SHIPPED | OUTPATIENT
Start: 2025-07-01

## 2025-07-01 RX ORDER — ROSUVASTATIN CALCIUM 40 MG/1
40 TABLET, COATED ORAL DAILY
Qty: 90 TABLET | Refills: 1 | Status: SHIPPED | OUTPATIENT
Start: 2025-07-01

## 2025-07-02 ENCOUNTER — RESULTS FOLLOW-UP (OUTPATIENT)
Dept: CARDIOLOGY | Facility: CLINIC | Age: 65
End: 2025-07-02

## 2025-07-02 ENCOUNTER — ANCILLARY PROCEDURE (OUTPATIENT)
Dept: CARDIOLOGY | Facility: CLINIC | Age: 65
End: 2025-07-02
Attending: INTERNAL MEDICINE
Payer: COMMERCIAL

## 2025-07-02 DIAGNOSIS — I25.810 CORONARY ARTERY DISEASE INVOLVING CORONARY BYPASS GRAFT OF NATIVE HEART WITHOUT ANGINA PECTORIS: ICD-10-CM

## 2025-07-02 DIAGNOSIS — I49.1 PAC (PREMATURE ATRIAL CONTRACTION): ICD-10-CM

## 2025-07-02 DIAGNOSIS — I21.3 ST ELEVATION MYOCARDIAL INFARCTION (STEMI), UNSPECIFIED ARTERY (H): ICD-10-CM

## 2025-07-02 LAB — BI-PLANE LVEF ECHO: NORMAL

## 2025-07-02 PROCEDURE — 93306 TTE W/DOPPLER COMPLETE: CPT | Performed by: INTERNAL MEDICINE

## 2025-07-02 RX ADMIN — Medication 5 ML (DILUTED): at 15:57

## 2025-07-03 ENCOUNTER — NURSE TRIAGE (OUTPATIENT)
Dept: NURSING | Facility: CLINIC | Age: 65
End: 2025-07-03
Payer: COMMERCIAL

## 2025-07-04 NOTE — TELEPHONE ENCOUNTER
Daughter Amita calling with patient on the phone who is having chest pain since about noon today. At noon and at 4 pm it was worse like when he was having a previous heart attack back in February.   The pain has been constant but fluctuating in severity.   Patient has nitroglycerin on his key chain but has not taken any.   Protocol recommends call 911  Explained 911 for ambulance as they can treat patient on the way to the hospital and call ahead to the ED with any concerns.   Daughter agreed to do this now.   Charity Quinn RN   07/03/25 8:04 PM  [facility] Nurse Advisor  Reason for Disposition   Chest pain lasting longer than 5 minutes and ANY of the following:    history of heart disease  (i.e., heart attack, bypass surgery, angina, angioplasty, CHF; not just a heart murmur)    described as crushing, pressure-like, or heavy    age > 44    age > 30 AND at least one cardiac risk factor (i.e., hypertension, diabetes, obesity, smoker or strong family history of heart disease)    not relieved with nitroglycerin    Additional Information   Negative: SEVERE difficulty breathing (e.g., struggling for each breath, speaks in single words)   Negative: Difficult to awaken or acting confused (e.g., disoriented, slurred speech)   Negative: Shock suspected (e.g., cold/pale/clammy skin, too weak to stand, low BP, rapid pulse)   Negative: Passed out (i.e., lost consciousness, collapsed and was not responding)    Protocols used: Chest Pain-A-

## 2025-07-05 ENCOUNTER — LAB (OUTPATIENT)
Dept: LAB | Facility: CLINIC | Age: 65
End: 2025-07-05
Payer: COMMERCIAL

## 2025-07-05 DIAGNOSIS — I50.22 CHRONIC SYSTOLIC HEART FAILURE (H): ICD-10-CM

## 2025-07-05 LAB
ANION GAP SERPL CALCULATED.3IONS-SCNC: 11 MMOL/L (ref 7–15)
BUN SERPL-MCNC: 24.9 MG/DL (ref 8–23)
CALCIUM SERPL-MCNC: 9.9 MG/DL (ref 8.8–10.4)
CHLORIDE SERPL-SCNC: 106 MMOL/L (ref 98–107)
CREAT SERPL-MCNC: 1.56 MG/DL (ref 0.67–1.17)
EGFRCR SERPLBLD CKD-EPI 2021: 49 ML/MIN/1.73M2
GLUCOSE SERPL-MCNC: 117 MG/DL (ref 70–99)
HCO3 SERPL-SCNC: 25 MMOL/L (ref 22–29)
POTASSIUM SERPL-SCNC: 4.4 MMOL/L (ref 3.4–5.3)
SODIUM SERPL-SCNC: 142 MMOL/L (ref 135–145)

## 2025-07-05 PROCEDURE — 80048 BASIC METABOLIC PNL TOTAL CA: CPT

## 2025-07-05 PROCEDURE — 36415 COLL VENOUS BLD VENIPUNCTURE: CPT

## 2025-07-19 DIAGNOSIS — E11.9 TYPE 2 DIABETES MELLITUS WITHOUT COMPLICATION, WITHOUT LONG-TERM CURRENT USE OF INSULIN (H): ICD-10-CM

## 2025-07-21 RX ORDER — SEMAGLUTIDE 0.68 MG/ML
INJECTION, SOLUTION SUBCUTANEOUS
Qty: 3 ML | Refills: 0 | Status: SHIPPED | OUTPATIENT
Start: 2025-07-21

## 2025-07-28 ENCOUNTER — DOCUMENTATION ONLY (OUTPATIENT)
Dept: CARDIOLOGY | Facility: CLINIC | Age: 65
End: 2025-07-28

## 2025-07-28 DIAGNOSIS — I50.22 CHRONIC SYSTOLIC HEART FAILURE (H): Primary | ICD-10-CM

## 2025-07-28 NOTE — PROGRESS NOTES
Cydney Flores has an upcoming lab appointment:    Future Appointments   Date Time Provider Department Center   7/30/2025  3:00 PM LAB FIRST FLOOR [facility] [place]   7/30/2025  3:20 PM Ken Fair APRN CNP CARD [place]   9/24/2025  8:00 AM Hernan Ibarra PA [facility] BK SLEEP     Patient is scheduled for the following lab(s): For 7/30/25 appt    There is no order available. Please review and place either future orders or HMPO (Review of Health Maintenance Protocol Orders), as appropriate.    Health Maintenance Due   Topic    A1C      Thank you, Peggy

## 2025-07-30 ENCOUNTER — TELEPHONE (OUTPATIENT)
Dept: CARDIOLOGY | Facility: CLINIC | Age: 65
End: 2025-07-30

## 2025-07-30 ENCOUNTER — OFFICE VISIT (OUTPATIENT)
Dept: CARDIOLOGY | Facility: CLINIC | Age: 65
End: 2025-07-30
Payer: COMMERCIAL

## 2025-07-30 ENCOUNTER — LAB (OUTPATIENT)
Dept: LAB | Facility: CLINIC | Age: 65
End: 2025-07-30
Payer: COMMERCIAL

## 2025-07-30 VITALS
DIASTOLIC BLOOD PRESSURE: 65 MMHG | HEART RATE: 64 BPM | BODY MASS INDEX: 34.06 KG/M2 | WEIGHT: 206.9 LBS | SYSTOLIC BLOOD PRESSURE: 103 MMHG | OXYGEN SATURATION: 97 %

## 2025-07-30 DIAGNOSIS — E11.22 CKD STAGE 3 DUE TO TYPE 2 DIABETES MELLITUS (H): Primary | ICD-10-CM

## 2025-07-30 DIAGNOSIS — I25.810 CORONARY ARTERY DISEASE INVOLVING CORONARY BYPASS GRAFT OF NATIVE HEART WITHOUT ANGINA PECTORIS: ICD-10-CM

## 2025-07-30 DIAGNOSIS — I50.22 CHRONIC SYSTOLIC HEART FAILURE (H): ICD-10-CM

## 2025-07-30 DIAGNOSIS — I50.22 CHRONIC SYSTOLIC HEART FAILURE (H): Primary | ICD-10-CM

## 2025-07-30 DIAGNOSIS — I10 ESSENTIAL HYPERTENSION WITH GOAL BLOOD PRESSURE LESS THAN 140/90: ICD-10-CM

## 2025-07-30 DIAGNOSIS — I25.5 ISCHEMIC CARDIOMYOPATHY: ICD-10-CM

## 2025-07-30 DIAGNOSIS — E11.9 DIABETES MELLITUS TYPE 2, NONINSULIN DEPENDENT (H): ICD-10-CM

## 2025-07-30 DIAGNOSIS — N18.30 CKD STAGE 3 DUE TO TYPE 2 DIABETES MELLITUS (H): Primary | ICD-10-CM

## 2025-07-30 LAB
ANION GAP SERPL CALCULATED.3IONS-SCNC: 12 MMOL/L (ref 7–15)
BUN SERPL-MCNC: 32.8 MG/DL (ref 8–23)
CALCIUM SERPL-MCNC: 9.2 MG/DL (ref 8.8–10.4)
CHLORIDE SERPL-SCNC: 111 MMOL/L (ref 98–107)
CREAT SERPL-MCNC: 1.84 MG/DL (ref 0.67–1.17)
EGFRCR SERPLBLD CKD-EPI 2021: 40 ML/MIN/1.73M2
EST. AVERAGE GLUCOSE BLD GHB EST-MCNC: 131 MG/DL
GLUCOSE SERPL-MCNC: 108 MG/DL (ref 70–99)
HBA1C MFR BLD: 6.2 % (ref 0–5.6)
HCO3 SERPL-SCNC: 19 MMOL/L (ref 22–29)
POTASSIUM SERPL-SCNC: 3.9 MMOL/L (ref 3.4–5.3)
SODIUM SERPL-SCNC: 142 MMOL/L (ref 135–145)

## 2025-07-30 PROCEDURE — 80048 BASIC METABOLIC PNL TOTAL CA: CPT | Performed by: NURSE PRACTITIONER

## 2025-07-30 PROCEDURE — 99214 OFFICE O/P EST MOD 30 MIN: CPT | Performed by: NURSE PRACTITIONER

## 2025-07-30 PROCEDURE — 83036 HEMOGLOBIN GLYCOSYLATED A1C: CPT

## 2025-07-30 PROCEDURE — 36415 COLL VENOUS BLD VENIPUNCTURE: CPT

## 2025-07-30 RX ORDER — SACUBITRIL AND VALSARTAN 24; 26 MG/1; MG/1
1 TABLET, FILM COATED ORAL 2 TIMES DAILY
Qty: 90 TABLET | Refills: 3 | Status: CANCELLED | OUTPATIENT
Start: 2025-07-30

## 2025-07-30 RX ORDER — SACUBITRIL AND VALSARTAN 24; 26 MG/1; MG/1
1 TABLET, FILM COATED ORAL 2 TIMES DAILY
COMMUNITY
Start: 2025-07-30

## 2025-07-30 RX ORDER — CALCIUM/MAGNESIUM/ZINC 333-133 MG
TABLET ORAL
COMMUNITY

## 2025-07-30 ASSESSMENT — PAIN SCALES - GENERAL: PAINLEVEL_OUTOF10: NO PAIN (0)

## 2025-07-30 NOTE — NURSING NOTE
Labs: Patient was given results of the laboratory testing obtained today. Patient demonstrated understanding of this information and agreed to call with further questions or concerns.     New Medication: Patient was educated regarding newly prescribed medication, including discussion of  the indication, administration, side effects, and when to report to MD or RN. Patient demonstrated understanding of this information and agreed to call with further questions or concerns. Start Entresto 24/26 mg (increase)     Return Appointment: Patient given instructions regarding scheduling next clinic visit. Patient demonstrated understanding of this information and agreed to call with further questions or concerns. Return CORE Oct and Follow-up Cardiology 6-9 months, next available     Patient stated he understood all health information given and agreed to call with further questions or concerns.    AURELIO Crawford

## 2025-07-30 NOTE — PATIENT INSTRUCTIONS
Take your medicines every day, as directed     Changes made today:    1) Entresto 24/26 mg twice a day     Monitor Your Weight and Symptoms     Contact us if you:     Gain 2 pounds in one day or 5 pounds in one week  Feel more short of breath  Notice more leg swelling  Feel lightheadeded    Change your lifestyle     Limit Salt or Sodium:  2000 mg  Limit Fluids:  2000 mL or approximately 64 ounces  Eat a Heart Healthy Diet  Low in saturated fats  Stay Active:  Aim to move at least 150 minutes every  week            To Contact us     During Business Hours:  [phone], option # 1      After hours, weekends or holidays:   [phone], Option #4  Ask to speak to the On-Call Cardiologist. Inform them you are a CORE/heart failure patient at the [facility].       Use [employer] allows you to communicate directly with your heart team through secure messaging.  [employer] can be accessed any time on your phone, computer, or tablet.  If you need assistance, we'd be happy to help!             Keep your Heart Appointments:     1) Follow-up CORE in October 2025   2) Next available cardiologist/wait list      Please consider attending our virtual support group which is held monthly. Please reach out to Jamel at [phone] for more information if you are interested in attending.

## 2025-07-30 NOTE — TELEPHONE ENCOUNTER
30 days supply test claims requested for the drugs below:  Jardiance 10mg daily, Farxiga 10mg daily       Jardiance 10mg daily  -$20.00 copay              Farxiga 10mg daily  -$20.00 copay

## 2025-07-30 NOTE — PROGRESS NOTES
HPI: Cydney is a 65 year old  male with a past medical history of :  1.  Type 2 diabetes  2.  Hypertension  3.  Hyperlipidemia  4.  Chronic renal sufficiency with a current baseline GFR of 49 mL/min as of 2/26/2025  5.  Obesity  6.  Gout    He has completed the rehab.  He said it went well.  He is here with his wife. No SOB at rest.  No SANTANA. He has some swelling on the left leg which has been there. Weight at home 204-207 lbs.  He has no other concerns. 120's systolic.     Denies SOB, SANTANA, PND, orthopnea, edema, weight gain, chest pain, palpitations, lightheadedness, dizziness, near syncopal/syncopal episodes. Cydney has been following salt and fluid restrictions.        EXAM:  /65 (BP Location: Left arm, Patient Position: Sitting, Cuff Size: Adult Large)   Pulse 64   Wt 93.8 kg (206 lb 14.4 oz)   SpO2 97%   BMI 34.06 kg/m    Home weight:  GENERAL: Appears comfortable, in no acute distress.   HEENT: Eye symmetrical, no discharge or icterus bilaterally.   CV: RRR, +S1S2, no murmur, rub, or gallop. JVP < 6 .   RESPIRATORY: Respirations regular, even, and unlabored. Lungs CTA throughout.  GI: Soft and non distended  EXTREMITIES: No peripheral edema. Extremities warm and well perfused.   NEUROLOGIC: Alert and interacting appropriately. No focal deficits.   MUSCULOSKELETAL: No joint swelling or tenderness.   SKIN: No jaundice.      Labs:  CBC RESULTS:  Lab Results   Component Value Date    WBC 8.7 03/15/2025    WBC 8.1 01/27/2021    RBC 3.74 (L) 03/15/2025    RBC 4.56 01/27/2021    HGB 11.2 (L) 03/15/2025    HGB 13.8 01/27/2021    HCT 35.0 (L) 03/15/2025    HCT 40.0 01/27/2021    MCV 94 03/15/2025    MCV 88 01/27/2021    MCH 29.9 03/15/2025    MCH 30.3 01/27/2021    MCHC 32.0 03/15/2025    MCHC 34.5 01/27/2021    RDW 14.0 03/15/2025    RDW 11.7 01/27/2021     (H) 03/15/2025     01/27/2021       CMP RESULTS:  Lab Results   Component Value Date     07/05/2025     01/27/2021    POTASSIUM  "4.4 07/05/2025    POTASSIUM 3.9 01/27/2021    CHLORIDE 106 07/05/2025    CHLORIDE 113 (H) 01/27/2021    CO2 25 07/05/2025    CO2 24 01/27/2021    ANIONGAP 11 07/05/2025    ANIONGAP 6 01/27/2021     (H) 07/05/2025    GLC 96 01/27/2021    BUN 24.9 (H) 07/05/2025    BUN 30 01/27/2021    CR 1.56 (H) 07/05/2025    CR 1.70 (H) 01/27/2021    GFRESTIMATED 49 (L) 07/05/2025    GFRESTIMATED 43 (L) 01/27/2021    GFRESTBLACK 50 (L) 01/27/2021    KRISHNA 9.9 07/05/2025    KRISHNA 9.1 01/27/2021    BILITOTAL 0.6 03/15/2025    BILITOTAL 0.3 01/27/2021    ALBUMIN 3.7 03/15/2025    ALBUMIN 3.6 01/27/2021    ALKPHOS 128 03/15/2025    ALKPHOS 105 01/27/2021    ALT 53 03/15/2025    ALT 69 01/27/2021    AST 28 03/15/2025     (H) 01/27/2021        INR RESULTS:  No results found for: \"INR\"    No components found for: \"CK\"  No results found for: \"MAG\"  Lab Results   Component Value Date    NTBNP 775 (H) 06/18/2025     @BRIEFLABR (dig)@    Most recent echocardiogram:  [facility] 2/25  nterpretation Summary     * Technically difficult study.     * The left ventricular systolic function is mildly decreased, estimated LVEF   40-45%.    * Left ventricular wall motion is abnormal with inferior and lateral   hypokinesis, and apical hypokinesis/akinesis.     * The right ventricle is normal size with quantitatively reduced right   ventricular systolic function.     * There is mild mitral regurgitation.     * The inferior vena cava is normal in size (< 2.1 cm), > 50% respiratory   variance, RA pressure normal at 3 mmHg.     * No prior study.       Assessment and Plan:    Cydney is a 64-year-old gentleman whose past medical history significant for chronic renal insufficiency with a baseline GFR 49 mL/min as of 2/26/2025, along with gout who has several active cardiac issues:     1.  Mild to moderate ischemic cardiomyopathy     The patient experienced an ST elevation myocardial infarction on 2/18/2025 secondary to an acutely thrombotic dominant " "circumflex stenosis that did not respond well to aspiration thrombectomy and PTCA.  Four-vessel coronary bypass surgery was performed on 2/20/2025 (LIMA to LAD, vein graft to diagonal, vein graft to OM, vein graft to left SHIVA)     Ejection fraction estimated 40 to 45% with an inferolateral wall motion normality and mild MR by echo 2/18/2025.     From a clinical standpoint, since being discharged from the hospital, he has done adequately and has not had recurrent anginal symptoms.  He seems to be fairly well compensated from a fluid balance standpoint at present, however he will need optimization of his CHF regimen in the future going forward.     2.  Hypertension     Goal systolic blood pressure 115 mmHg without gross side effects or symptoms, blood pressures currently running 103 in clinic mmHg systolic.     3.  Hyperlipidemia     Recently started Crestor 40 mg a day, continue to follow, goal LDL less than 70 or preferably lower.    4.   DVT - \"Upper extremity venous ultrasound 4/2/2025 confirms a high-grade deep venous thrombosis of the right internal jugular vein, incidentally noted in the context of workup for left elbow pain.\"  Taking Apixaban. Will consult with Dr. Rush if they should return to ASA and stop Apixaban. The family stated the plan was for 3 months on the Apixaban . Patient denies any symptoms - such as pain or swelling.    BMP reviewed today     1.  Chronic systolic heart failure secondary ICM .  Stage B  NYHA Class II  ACEi/ARB: yes- will increase Entresto 24/26 mg BID - for GDMT with HFrEF  BB: yes  Aldosterone antagonist: no  SGLT2i- not started yet- EF recent is 54%   SCD prophylaxis: does not meet criteria for implant  Fluid status: euvolemic  Anticoagulation: Eliquis   Sleep apnea: needs to see sleep medicine  NSAID use:  Contraindicated.  Avoid use.  Remote Monitoring:none  GLP-1 - Ozempic- improved A1C 6.0    2.  Follow-up   Increase Entresto 24/26 mg BID   [facility] in one month - Oct in " [facility]      Ken SHAHID, CNP  [facility]

## 2025-07-30 NOTE — NURSING NOTE
"Chief Complaint   Patient presents with    Follow Up     [facility] Return, 64 yo male with systolic heart failure presents for initial visit with labs prior.       Initial /65 (BP Location: Left arm, Patient Position: Sitting, Cuff Size: Adult Large)   Pulse 64   Wt 93.8 kg (206 lb 14.4 oz)   SpO2 97%   BMI 34.06 kg/m   Estimated body mass index is 34.06 kg/m  as calculated from the following:    Height as of 6/23/25: 1.66 m (5' 5.35\").    Weight as of this encounter: 93.8 kg (206 lb 14.4 oz).  BP completed using cuff size: jude Hudson, EMT  "

## 2025-08-05 ENCOUNTER — VIRTUAL VISIT (OUTPATIENT)
Dept: FAMILY MEDICINE | Facility: CLINIC | Age: 65
End: 2025-08-05
Payer: COMMERCIAL

## 2025-08-05 DIAGNOSIS — E66.812 CLASS 2 SEVERE OBESITY WITH SERIOUS COMORBIDITY AND BODY MASS INDEX (BMI) OF 35.0 TO 35.9 IN ADULT, UNSPECIFIED OBESITY TYPE (H): ICD-10-CM

## 2025-08-05 DIAGNOSIS — R19.7 DIARRHEA, UNSPECIFIED TYPE: ICD-10-CM

## 2025-08-05 DIAGNOSIS — N18.32 CHRONIC KIDNEY DISEASE, STAGE 3B (H): ICD-10-CM

## 2025-08-05 DIAGNOSIS — E11.69 TYPE 2 DIABETES MELLITUS WITH OTHER SPECIFIED COMPLICATION, UNSPECIFIED WHETHER LONG TERM INSULIN USE (H): Primary | ICD-10-CM

## 2025-08-05 DIAGNOSIS — E66.01 CLASS 2 SEVERE OBESITY WITH SERIOUS COMORBIDITY AND BODY MASS INDEX (BMI) OF 35.0 TO 35.9 IN ADULT, UNSPECIFIED OBESITY TYPE (H): ICD-10-CM

## 2025-08-05 PROCEDURE — 98013 SYNCH AUDIO-ONLY EST LOW 20: CPT | Performed by: FAMILY MEDICINE

## 2025-08-05 ASSESSMENT — ENCOUNTER SYMPTOMS: DIARRHEA: 1

## 2025-08-19 ENCOUNTER — TELEPHONE (OUTPATIENT)
Dept: GASTROENTEROLOGY | Facility: CLINIC | Age: 65
End: 2025-08-19
Payer: COMMERCIAL

## 2025-08-25 DIAGNOSIS — I50.22 CHRONIC SYSTOLIC HEART FAILURE (H): Primary | ICD-10-CM

## 2025-08-27 ENCOUNTER — LAB (OUTPATIENT)
Dept: LAB | Facility: CLINIC | Age: 65
End: 2025-08-27
Payer: COMMERCIAL

## 2025-08-27 ENCOUNTER — OFFICE VISIT (OUTPATIENT)
Dept: CARDIOLOGY | Facility: CLINIC | Age: 65
End: 2025-08-27
Payer: COMMERCIAL

## 2025-08-27 VITALS
HEIGHT: 65 IN | HEART RATE: 80 BPM | OXYGEN SATURATION: 94 % | DIASTOLIC BLOOD PRESSURE: 66 MMHG | WEIGHT: 209.7 LBS | SYSTOLIC BLOOD PRESSURE: 98 MMHG | BODY MASS INDEX: 34.94 KG/M2

## 2025-08-27 DIAGNOSIS — I50.22 CHRONIC SYSTOLIC HEART FAILURE (H): ICD-10-CM

## 2025-08-27 DIAGNOSIS — N18.32 CHRONIC KIDNEY DISEASE, STAGE 3B (H): Primary | ICD-10-CM

## 2025-08-27 LAB
ANION GAP SERPL CALCULATED.3IONS-SCNC: 12 MMOL/L (ref 7–15)
BUN SERPL-MCNC: 26.6 MG/DL (ref 8–23)
CALCIUM SERPL-MCNC: 9.2 MG/DL (ref 8.8–10.4)
CHLORIDE SERPL-SCNC: 107 MMOL/L (ref 98–107)
CREAT SERPL-MCNC: 1.71 MG/DL (ref 0.67–1.17)
CREAT UR-MCNC: 198 MG/DL
EGFRCR SERPLBLD CKD-EPI 2021: 44 ML/MIN/1.73M2
GLUCOSE SERPL-MCNC: 116 MG/DL (ref 70–99)
HCO3 SERPL-SCNC: 24 MMOL/L (ref 22–29)
MICROALBUMIN UR-MCNC: 46.8 MG/L
MICROALBUMIN/CREAT UR: 23.64 MG/G CR (ref 0–17)
PHOSPHATE SERPL-MCNC: 3.3 MG/DL (ref 2.5–4.5)
POTASSIUM SERPL-SCNC: 3.7 MMOL/L (ref 3.4–5.3)
PTH-INTACT SERPL-MCNC: 37 PG/ML (ref 15–65)
SODIUM SERPL-SCNC: 143 MMOL/L (ref 135–145)

## 2025-08-27 PROCEDURE — 83970 ASSAY OF PARATHORMONE: CPT

## 2025-08-27 PROCEDURE — 82043 UR ALBUMIN QUANTITATIVE: CPT

## 2025-08-27 PROCEDURE — 82570 ASSAY OF URINE CREATININE: CPT

## 2025-08-27 PROCEDURE — 84100 ASSAY OF PHOSPHORUS: CPT

## 2025-08-27 RX ORDER — SACUBITRIL AND VALSARTAN 24; 26 MG/1; MG/1
1 TABLET ORAL 2 TIMES DAILY
Qty: 60 TABLET | Refills: 5 | Status: SHIPPED | OUTPATIENT
Start: 2025-08-27

## 2025-08-27 ASSESSMENT — PAIN SCALES - GENERAL: PAINLEVEL_OUTOF10: NO PAIN (0)

## 2025-08-28 ENCOUNTER — MYC REFILL (OUTPATIENT)
Dept: FAMILY MEDICINE | Facility: CLINIC | Age: 65
End: 2025-08-28
Payer: COMMERCIAL

## 2025-08-28 DIAGNOSIS — E78.5 HYPERLIPIDEMIA LDL GOAL <100: ICD-10-CM

## 2025-08-28 DIAGNOSIS — I25.10 CORONARY ARTERY DISEASE INVOLVING NATIVE HEART WITHOUT ANGINA PECTORIS, UNSPECIFIED VESSEL OR LESION TYPE: ICD-10-CM

## 2025-08-28 PROBLEM — N18.30 CKD STAGE 3 DUE TO TYPE 2 DIABETES MELLITUS (H): Status: RESOLVED | Noted: 2025-02-28 | Resolved: 2025-08-28

## 2025-08-28 PROBLEM — E11.22 CKD STAGE 3 DUE TO TYPE 2 DIABETES MELLITUS (H): Status: RESOLVED | Noted: 2025-02-28 | Resolved: 2025-08-28

## 2025-08-28 RX ORDER — METOPROLOL SUCCINATE 25 MG/1
12.5 TABLET, EXTENDED RELEASE ORAL DAILY
Qty: 45 TABLET | Refills: 1 | OUTPATIENT
Start: 2025-08-28

## 2025-08-28 RX ORDER — ROSUVASTATIN CALCIUM 40 MG/1
40 TABLET, COATED ORAL DAILY
Qty: 90 TABLET | Refills: 1 | OUTPATIENT
Start: 2025-08-28

## 2025-08-28 RX ORDER — ASPIRIN 325 MG
325 TABLET ORAL DAILY
Qty: 30 TABLET | Refills: 2 | Status: SHIPPED | OUTPATIENT
Start: 2025-08-28

## (undated) DEVICE — SOL WATER IRRIG 1000ML BOTTLE 07139-09

## (undated) RX ORDER — FENTANYL CITRATE 50 UG/ML
INJECTION, SOLUTION INTRAMUSCULAR; INTRAVENOUS
Status: DISPENSED
Start: 2018-05-11

## (undated) RX ORDER — SIMETHICONE 40MG/0.6ML
SUSPENSION, DROPS(FINAL DOSAGE FORM)(ML) ORAL
Status: DISPENSED
Start: 2018-05-11